# Patient Record
Sex: FEMALE | Race: WHITE | NOT HISPANIC OR LATINO | Employment: OTHER | URBAN - METROPOLITAN AREA
[De-identification: names, ages, dates, MRNs, and addresses within clinical notes are randomized per-mention and may not be internally consistent; named-entity substitution may affect disease eponyms.]

---

## 2019-07-24 RX ORDER — BUPROPION HYDROCHLORIDE 300 MG/1
300 TABLET ORAL DAILY
Qty: 30 TABLET | Refills: 2 | Status: SHIPPED | OUTPATIENT
Start: 2019-07-24 | End: 2019-12-05 | Stop reason: SDUPTHER

## 2019-07-24 RX ORDER — BUPROPION HYDROCHLORIDE 300 MG/1
1 TABLET ORAL DAILY
COMMUNITY
Start: 2018-12-31 | End: 2019-07-24 | Stop reason: SDUPTHER

## 2019-09-25 RX ORDER — RANITIDINE 150 MG/1
150 TABLET ORAL 2 TIMES DAILY
Qty: 60 TABLET | Refills: 2 | Status: SHIPPED | OUTPATIENT
Start: 2019-09-25 | End: 2021-03-10

## 2019-09-25 RX ORDER — RANITIDINE 150 MG/1
1 TABLET ORAL 2 TIMES DAILY
COMMUNITY
Start: 2019-04-06 | End: 2019-09-25 | Stop reason: SDUPTHER

## 2019-10-18 ENCOUNTER — OFFICE VISIT (OUTPATIENT)
Dept: FAMILY MEDICINE CLINIC | Facility: CLINIC | Age: 54
End: 2019-10-18

## 2019-10-18 VITALS
SYSTOLIC BLOOD PRESSURE: 130 MMHG | HEIGHT: 66 IN | BODY MASS INDEX: 21.28 KG/M2 | WEIGHT: 132.4 LBS | TEMPERATURE: 97.5 F | DIASTOLIC BLOOD PRESSURE: 79 MMHG | HEART RATE: 82 BPM | OXYGEN SATURATION: 93 %

## 2019-10-18 DIAGNOSIS — J06.9 ACUTE URI: Primary | ICD-10-CM

## 2019-10-18 PROBLEM — M46.1 INFLAMMATION OF SACROILIAC JOINT: Status: ACTIVE | Noted: 2017-01-05

## 2019-10-18 PROBLEM — F41.8 ANXIETY ASSOCIATED WITH DEPRESSION: Status: ACTIVE | Noted: 2017-12-14

## 2019-10-18 PROBLEM — Z87.09 HISTORY OF ASTHMA: Status: ACTIVE | Noted: 2019-09-03

## 2019-10-18 PROBLEM — R41.89 COGNITIVE IMPAIRMENT: Status: ACTIVE | Noted: 2018-06-27

## 2019-10-18 PROBLEM — J30.1 ALLERGIC RHINITIS DUE TO POLLEN: Status: ACTIVE | Noted: 2017-08-15

## 2019-10-18 PROCEDURE — 99213 OFFICE O/P EST LOW 20 MIN: CPT | Performed by: FAMILY MEDICINE

## 2019-10-18 RX ORDER — ASPIRIN 81 MG/1
1 TABLET ORAL EVERY 24 HOURS
COMMUNITY
Start: 2018-12-31 | End: 2021-03-10

## 2019-10-18 RX ORDER — DIPHENHYDRAMINE HCL 25 MG
25 TABLET ORAL AS NEEDED
COMMUNITY
End: 2021-03-10

## 2019-10-18 RX ORDER — AMOXICILLIN AND CLAVULANATE POTASSIUM 875; 125 MG/1; MG/1
1 TABLET, FILM COATED ORAL 2 TIMES DAILY
Qty: 20 TABLET | Refills: 0 | Status: SHIPPED | OUTPATIENT
Start: 2019-10-18 | End: 2019-11-08

## 2019-10-18 RX ORDER — GEMFIBROZIL 600 MG/1
1 TABLET, FILM COATED ORAL DAILY
COMMUNITY
Start: 2019-01-08 | End: 2021-03-10

## 2019-10-18 RX ORDER — GABAPENTIN 100 MG/1
300 CAPSULE ORAL DAILY
COMMUNITY
Start: 2016-02-26 | End: 2021-03-10

## 2019-10-18 RX ORDER — CETIRIZINE HYDROCHLORIDE 10 MG/1
10 TABLET ORAL DAILY
Refills: 11 | COMMUNITY
Start: 2019-09-25 | End: 2019-11-08

## 2019-10-18 RX ORDER — BRIMONIDINE TARTRATE 0.1 %
1 DROPS OPHTHALMIC (EYE) 2 TIMES DAILY
Refills: 5 | COMMUNITY
Start: 2019-08-28 | End: 2021-03-10

## 2019-10-18 RX ORDER — GUAIFENESIN 600 MG/1
1200 TABLET, EXTENDED RELEASE ORAL 2 TIMES DAILY
Qty: 14 TABLET | Refills: 0 | Status: SHIPPED | OUTPATIENT
Start: 2019-10-18 | End: 2021-03-10

## 2019-10-18 NOTE — PROGRESS NOTES
Subjective   Celine Musa is a 54 y.o. female.   Chief Complaint   Patient presents with   • Sore Throat   • Jaw Pain   • Earache       History of Present Illness   Presents to the office today with a problem.  She complains of pain in both ears, sore throat, pain in her left jaw and left side of her neck off and on for about 1 month.  She has some nasal congestion.  Occasionally blows her nose and get some yellowish-green phlegm that is blood-tinged.  She also complains of her nose being sticky.    She mentions additionally that she had some ongoing neck problems and pain down the right side of her body.  She tells me that her chiropractor told her it could be a disc problem and she should see her primary doctor.      There are no active problems to display for this patient.          Past Surgical History:   Procedure Laterality Date   • EYE SURGERY     • KNEE ARTHROSCOPY Left      Current Outpatient Medications on File Prior to Visit   Medication Sig   • ALPHAGAN P 0.1 % solution ophthalmic solution Administer 1 drop to both eyes 2 (Two) Times a Day.   • aspirin (ASPIRIN ADULT LOW DOSE) 81 MG EC tablet Take 1 tablet by mouth Daily.   • buPROPion XL (WELLBUTRIN XL) 300 MG 24 hr tablet Take 1 tablet by mouth Daily.   • cetirizine (zyrTEC) 10 MG tablet Take 10 mg by mouth Daily.   • Cholecalciferol (VITAMIN D3) 1000 units capsule Take 1 capsule by mouth Daily.   • diphenhydrAMINE (BENADRYL) 25 MG tablet Take 25 mg by mouth As Needed.   • gabapentin (NEURONTIN) 100 MG capsule Take 300 mg by mouth Daily.   • gemfibrozil (LOPID) 600 MG tablet Take 1 tablet by mouth Daily.   • Pilocarpine HCl (PILOCAR OP) 1-2 drops 4 (Four) Times a Day.   • PROAIR  (90 Base) MCG/ACT inhaler Inhale 1 puff 4 (Four) Times a Day.   • raNITIdine (ZANTAC) 150 MG tablet Take 1 tablet by mouth 2 (Two) Times a Day.   • Teriflunomide 14 MG tablet Take 14 mg by mouth Daily.   • [DISCONTINUED] brimonidine (ALPHAGAN P) 0.1 % solution  "ophthalmic solution Administer 1 drop to both eyes 2 (Two) Times a Day.     No current facility-administered medications on file prior to visit.      Allergies   Allergen Reactions   • Lipitor [Atorvastatin Calcium] Palpitations     Social History     Socioeconomic History   • Marital status: Unknown     Spouse name: Not on file   • Number of children: Not on file   • Years of education: Not on file   • Highest education level: Not on file   Tobacco Use   • Smoking status: Former Smoker     Last attempt to quit: 1989     Years since quittin.8   • Smokeless tobacco: Never Used     Family History   Problem Relation Age of Onset   • Hyperlipidemia Mother    • Hyperlipidemia Father    • Heart disease Father    • Leukemia Father    • No Known Problems Son    • No Known Problems Half-Sister    • No Known Problems Half-Sister    • No Known Problems Son      The following portions of the patient's history were reviewed and updated as appropriate: allergies, current medications and problem list.    Review of Systems   Constitutional: Negative for chills and fever.   Respiratory: Negative for cough and shortness of breath.    Cardiovascular: Negative for chest pain.   Gastrointestinal: Negative for abdominal pain.   Neurological: Negative for light-headedness and headache.       Objective   /79 (BP Location: Right arm, Patient Position: Sitting, Cuff Size: Adult)   Pulse 82   Temp 97.5 °F (36.4 °C) (Oral)   Ht 167.6 cm (66\")   Wt 60.1 kg (132 lb 6.4 oz)   SpO2 93%   BMI 21.37 kg/m²   Physical Exam   Constitutional: She is oriented to person, place, and time. She appears well-developed and well-nourished.   HENT:   Head: Normocephalic and atraumatic.   Right Ear: Ear canal normal. Tympanic membrane is bulging (clear fluid behind). Tympanic membrane is not erythematous and not retracted. cerumen impaction (only partial) is present.  Left Ear: Ear canal normal. Tympanic membrane is bulging (clear fluid " behind). Tympanic membrane is not erythematous and not retracted. An impacted cerumen (only partial) is present.  Nose: Mucosal edema and congestion present. No nasal deformity or nasal septal hematoma.  No foreign bodies. Right sinus exhibits maxillary sinus tenderness. Left sinus exhibits maxillary sinus tenderness.   Eyes: Conjunctivae and EOM are normal.   Neck: Normal range of motion.   Cardiovascular: Normal rate, regular rhythm and normal heart sounds.   Pulmonary/Chest: Effort normal and breath sounds normal. No respiratory distress.   Musculoskeletal: Normal range of motion.   Neurological: She is alert and oriented to person, place, and time.   Skin: Skin is warm and dry. No rash noted.   Psychiatric: She has a normal mood and affect. Her behavior is normal.       Assessment/Plan   Diagnoses and all orders for this visit:    1. Acute URI (Primary)    Other orders  -     amoxicillin-clavulanate (AUGMENTIN) 875-125 MG per tablet; Take 1 tablet by mouth 2 (Two) Times a Day.  Dispense: 20 tablet; Refill: 0  -     guaiFENesin (MUCINEX) 600 MG 12 hr tablet; Take 2 tablets by mouth 2 (Two) Times a Day.  Dispense: 14 tablet; Refill: 0  -     mupirocin (BACTROBAN NASAL) 2 % nasal ointment; into the nostril(s) as directed by provider 2 (Two) Times a Day for 7 days.  Dispense: 1 g; Refill: 1    Appears to be a lot of upper nasal congestion and possibly some macular sinusitis with Augmentin.  Also use Mucinex twice daily for 1 week lots of water.  We will have her do the nasal Bactroban twice daily for 7 days as I suspect that she has some colonization of her nasal mucosa.  Recommend following up with Chantale next week or at her convenience to talk about the potential issues raised above.             Return for needs to see Chantale soon to see about neck and  lateral numbness.

## 2019-10-30 ENCOUNTER — TELEPHONE (OUTPATIENT)
Dept: FAMILY MEDICINE CLINIC | Facility: CLINIC | Age: 54
End: 2019-10-30

## 2019-10-30 RX ORDER — AZITHROMYCIN 250 MG/1
TABLET, FILM COATED ORAL
Qty: 6 TABLET | Refills: 0 | Status: SHIPPED | OUTPATIENT
Start: 2019-10-30 | End: 2019-11-08

## 2019-10-30 NOTE — TELEPHONE ENCOUNTER
Pt calling was just seen in office recently still no better. Finished amoicillian yest.  Still having same issues, trouble breathing , radha ear pain, sore throat.  Pt stated Lanie helped before.  Please advise

## 2019-11-08 ENCOUNTER — TELEPHONE (OUTPATIENT)
Dept: FAMILY MEDICINE CLINIC | Facility: CLINIC | Age: 54
End: 2019-11-08

## 2019-11-08 ENCOUNTER — OFFICE VISIT (OUTPATIENT)
Dept: FAMILY MEDICINE CLINIC | Facility: CLINIC | Age: 54
End: 2019-11-08

## 2019-11-08 VITALS
OXYGEN SATURATION: 98 % | HEART RATE: 69 BPM | TEMPERATURE: 97.9 F | DIASTOLIC BLOOD PRESSURE: 73 MMHG | HEIGHT: 66 IN | SYSTOLIC BLOOD PRESSURE: 111 MMHG | BODY MASS INDEX: 21.21 KG/M2 | WEIGHT: 132 LBS | RESPIRATION RATE: 18 BRPM

## 2019-11-08 DIAGNOSIS — R05.9 COUGH: ICD-10-CM

## 2019-11-08 DIAGNOSIS — M54.2 NECK PAIN: ICD-10-CM

## 2019-11-08 DIAGNOSIS — G89.29 CHRONIC MIDLINE LOW BACK PAIN WITH BILATERAL SCIATICA: ICD-10-CM

## 2019-11-08 DIAGNOSIS — M54.42 CHRONIC MIDLINE LOW BACK PAIN WITH BILATERAL SCIATICA: ICD-10-CM

## 2019-11-08 DIAGNOSIS — M54.41 CHRONIC MIDLINE LOW BACK PAIN WITH BILATERAL SCIATICA: ICD-10-CM

## 2019-11-08 DIAGNOSIS — R09.82 POST-NASAL DRAINAGE: Primary | ICD-10-CM

## 2019-11-08 PROCEDURE — 99214 OFFICE O/P EST MOD 30 MIN: CPT | Performed by: FAMILY MEDICINE

## 2019-11-08 RX ORDER — FEXOFENADINE HCL 180 MG/1
180 TABLET ORAL DAILY
COMMUNITY
End: 2019-11-08 | Stop reason: SDUPTHER

## 2019-11-08 RX ORDER — FEXOFENADINE HCL 180 MG/1
180 TABLET ORAL DAILY
Qty: 30 TABLET | Refills: 0 | Status: SHIPPED | OUTPATIENT
Start: 2019-11-08 | End: 2021-03-10

## 2019-11-08 NOTE — TELEPHONE ENCOUNTER
PATIENT CALLED SAID SHE WENT TO THE PHARMACY AND SHE SAID SHE WAS SUPPOSED TO  TWO PRESCRIPTIONS AND ONLY GOT ONE- SHE SAID  ACTED LIKE HE WAS GOING TO CALL SOMETHING IN FOR HER CHEST CONGESTION- [facility] IN [place]

## 2019-11-08 NOTE — PROGRESS NOTES
Subjective   Celine Musa is a 54 y.o. female.   Chief Complaint   Patient presents with   • URI     still coughing and not feeling well. Still has productive cough, but does not have a color.        History of Present Illness   Came in today with complaint that she was still sick.  Upon further questioning, she has an occasional dry cough.  She has a postnasal drip and runny nose.  She does not have any fevers, chills.  She then sideways into essentially unrelated issues.  She described at the last visit, and brings up again today she has numbness and tingling in all 4 extremity's at random times.  She describes having pain and popping noises in her back and neck.  She describes having pain in her mid back when she coughs.  Tells me she thought she might have pneumonia because of that.    She was given Bactroban at the last visit because of some minimal bloody nasal discharge due to friable mucous membranes of the nares.  She lost that and request a refill.      Patient Active Problem List    Diagnosis Date Noted   • Neck pain 11/08/2019   • Chronic midline low back pain with bilateral sciatica 11/08/2019   • History of asthma 09/03/2019   • Cognitive impairment 06/27/2018   • Anxiety associated with depression 12/14/2017   • Allergic rhinitis due to pollen 08/15/2017   • Inflammation of sacroiliac joint (CMS/[facility]) 01/05/2017   • Acid reflux 02/26/2016   • Glaucoma 02/26/2016   • Constipation 02/26/2016   • History of anemia 02/26/2016   • Multiple sclerosis (CMS/[facility]) 09/25/2012           Past Surgical History:   Procedure Laterality Date   • EYE SURGERY     • KNEE ARTHROSCOPY Left      Current Outpatient Medications on File Prior to Visit   Medication Sig   • ALPHAGAN P 0.1 % solution ophthalmic solution Administer 1 drop to both eyes 2 (Two) Times a Day.   • aspirin (ASPIRIN ADULT LOW DOSE) 81 MG EC tablet Take 1 tablet by mouth Daily.   • buPROPion XL (WELLBUTRIN XL) 300 MG 24 hr tablet Take 1 tablet  by mouth Daily.   • cetirizine (zyrTEC) 10 MG tablet Take 10 mg by mouth Daily.   • Cholecalciferol (VITAMIN D3) 1000 units capsule Take 1 capsule by mouth Daily.   • diphenhydrAMINE (BENADRYL) 25 MG tablet Take 25 mg by mouth As Needed.   • gabapentin (NEURONTIN) 100 MG capsule Take 300 mg by mouth Daily.   • gemfibrozil (LOPID) 600 MG tablet Take 1 tablet by mouth Daily.   • guaiFENesin (MUCINEX) 600 MG 12 hr tablet Take 2 tablets by mouth 2 (Two) Times a Day.   • Pilocarpine HCl (PILOCAR OP) 1-2 drops 4 (Four) Times a Day.   • PROAIR  (90 Base) MCG/ACT inhaler Inhale 1 puff 4 (Four) Times a Day.   • raNITIdine (ZANTAC) 150 MG tablet Take 1 tablet by mouth 2 (Two) Times a Day.   • Teriflunomide 14 MG tablet Take 14 mg by mouth Daily.   • [DISCONTINUED] mupirocin (BACTROBAN) 2 % ointment 1 application into the nostril(s) as directed by provider 2 (Two) Times a Day. For 7 days   • [DISCONTINUED] amoxicillin-clavulanate (AUGMENTIN) 875-125 MG per tablet Take 1 tablet by mouth 2 (Two) Times a Day.   • [DISCONTINUED] azithromycin (ZITHROMAX Z-MARGAUX) 250 MG tablet Take 2 tablets the first day, then 1 tablet daily for 4 days.     No current facility-administered medications on file prior to visit.      Allergies   Allergen Reactions   • Lipitor [Atorvastatin Calcium] Palpitations     Social History     Socioeconomic History   • Marital status: Unknown     Spouse name: Not on file   • Number of children: Not on file   • Years of education: Not on file   • Highest education level: Not on file   Tobacco Use   • Smoking status: Former Smoker     Last attempt to quit: 1989     Years since quittin.8   • Smokeless tobacco: Never Used     Family History   Problem Relation Age of Onset   • Hyperlipidemia Mother    • Hyperlipidemia Father    • Heart disease Father    • Leukemia Father    • No Known Problems Son    • No Known Problems Half-Sister    • No Known Problems Half-Sister    • No Known Problems Son      The  "following portions of the patient's history were reviewed and updated as appropriate: allergies, current medications, past family history, past medical history, past social history, past surgical history and problem list.    Review of Systems   Constitutional: Negative for chills and fever.   Respiratory: Positive for cough. Negative for shortness of breath.    Cardiovascular: Negative for chest pain.   Gastrointestinal: Negative for abdominal pain.   Neurological: Positive for dizziness, numbness and memory problem. Negative for seizures, light-headedness, headache and confusion.       Objective   /73 (BP Location: Left arm, Patient Position: Sitting, Cuff Size: Adult)   Pulse 69   Temp 97.9 °F (36.6 °C) (Oral)   Resp 18   Ht 167.6 cm (66\")   Wt 59.9 kg (132 lb)   SpO2 98%   BMI 21.31 kg/m²   Physical Exam   Constitutional: She is oriented to person, place, and time. She appears well-developed and well-nourished.   HENT:   Head: Normocephalic and atraumatic.   Nose: Rhinorrhea (clear) present.   Mouth/Throat: Oropharynx is clear and moist.   Eyes: Conjunctivae and EOM are normal. Pupils are equal, round, and reactive to light.   Neck: Neck supple. No JVD present. No thyromegaly present.   Cardiovascular: Normal rate, regular rhythm, normal heart sounds and intact distal pulses.   No murmur heard.  Pulmonary/Chest: Effort normal and breath sounds normal. No respiratory distress. She has no wheezes. She has no rales. She exhibits no tenderness.   Abdominal: Soft. She exhibits no distension and no mass. There is no tenderness. There is no rebound and no guarding.   Musculoskeletal: Normal range of motion. She exhibits no edema.   Lymphadenopathy:     She has no cervical adenopathy.   Neurological: She is alert and oriented to person, place, and time.   Skin: Skin is warm. No rash noted.   Psychiatric: She has a normal mood and affect. Her behavior is normal.         Assessment/Plan   Diagnoses and all " orders for this visit:    1. Post-nasal drainage (Primary)    2. Cough  -     XR Chest 2 View; Future    3. Neck pain  -     XR Spine Cervical Complete 4 or 5 View; Future    4. Chronic midline low back pain with bilateral sciatica  -     XR Spine Lumbar 4+ View; Future    Other orders  -     mupirocin (BACTROBAN) 2 % ointment; 1 application into the nostril(s) as directed by provider 2 (Two) Times a Day. For 7 days  Dispense: 30 g; Refill: 1    Review of the chart indicates she has a diagnosis of multiple sclerosis, and she does not mention that at all today.  It looks like she has seen a Dr. Martin at [facility] for her MS.  Review of the records further indicates she has complained then of numbness in her arms and legs that he attributed to her MS.  She does not appear to be clinically ill today, and I recommend we continue to treat her rhinorrhea with an antihistamine and Mucinex.  We will begin a work-up of her spine with cervical and lumbar x-rays.  We will go ahead and get a chest x-ray because of her chronic cough.  If work-up appears negative, it is likely these paresthesias symptoms are related to her MS I will defer that further on to her neurologist.         Return in about 1 week (around 11/15/2019).to go over her x-rays

## 2019-11-08 NOTE — TELEPHONE ENCOUNTER
S/w Dr. Butcher re: this patient earlier. Per Dr. Nugent, cancel patient's Zyrtec and have patient start taking 180mg of Allegra once daily. Called patient. Patient's identity verified.  Advised her to STOP Zyrtec and START Allegra. She was advised that a 30 day supply of the Allegra was sent to pharmacy. She voiced understanding. No extra concerns voiced by patient.

## 2019-11-26 DIAGNOSIS — M54.2 NECK PAIN: ICD-10-CM

## 2019-11-26 DIAGNOSIS — G89.29 CHRONIC MIDLINE LOW BACK PAIN WITH BILATERAL SCIATICA: ICD-10-CM

## 2019-11-26 DIAGNOSIS — R05.9 COUGH: ICD-10-CM

## 2019-11-26 DIAGNOSIS — M54.41 CHRONIC MIDLINE LOW BACK PAIN WITH BILATERAL SCIATICA: ICD-10-CM

## 2019-11-26 DIAGNOSIS — M54.42 CHRONIC MIDLINE LOW BACK PAIN WITH BILATERAL SCIATICA: ICD-10-CM

## 2019-11-27 ENCOUNTER — TELEPHONE (OUTPATIENT)
Dept: FAMILY MEDICINE CLINIC | Facility: CLINIC | Age: 54
End: 2019-11-27

## 2019-11-27 NOTE — TELEPHONE ENCOUNTER
----- Message from Jovanna Butcher MD sent at 11/26/2019  5:25 PM EST -----  Please tell patient that I thought the plan was for her to come back and go over her x-rays.  If she would like to do that, have her schedule an appointment,  Thanks

## 2019-12-05 DIAGNOSIS — F41.8 ANXIETY ASSOCIATED WITH DEPRESSION: Primary | ICD-10-CM

## 2019-12-05 RX ORDER — BUPROPION HYDROCHLORIDE 300 MG/1
TABLET ORAL
Qty: 30 TABLET | Refills: 2 | Status: SHIPPED | OUTPATIENT
Start: 2019-12-05 | End: 2020-02-17

## 2019-12-05 NOTE — TELEPHONE ENCOUNTER
Received refill request for patient's Wellbutrin.   Last OV: 11-8-19 (acute visit only)  Next OV: NONE scheduled   Last Labs: 9-3-19    Med is loaded and ready to be refilled. Thanks.

## 2020-02-16 DIAGNOSIS — F41.8 ANXIETY ASSOCIATED WITH DEPRESSION: ICD-10-CM

## 2020-02-17 RX ORDER — BUPROPION HYDROCHLORIDE 300 MG/1
TABLET ORAL
Qty: 90 TABLET | Refills: 3 | Status: SHIPPED | OUTPATIENT
Start: 2020-02-17 | End: 2021-03-10

## 2021-03-10 ENCOUNTER — OFFICE VISIT (OUTPATIENT)
Dept: FAMILY MEDICINE CLINIC | Facility: CLINIC | Age: 56
End: 2021-03-10

## 2021-03-10 VITALS
WEIGHT: 130.6 LBS | HEIGHT: 66 IN | TEMPERATURE: 97.1 F | DIASTOLIC BLOOD PRESSURE: 84 MMHG | SYSTOLIC BLOOD PRESSURE: 126 MMHG | OXYGEN SATURATION: 96 % | HEART RATE: 73 BPM | BODY MASS INDEX: 20.99 KG/M2

## 2021-03-10 DIAGNOSIS — S99.921A INJURY OF TOE ON RIGHT FOOT, INITIAL ENCOUNTER: Primary | ICD-10-CM

## 2021-03-10 PROBLEM — D32.9 MENINGIOMA: Status: ACTIVE | Noted: 2020-12-15

## 2021-03-10 PROBLEM — M47.812 OSTEOARTHRITIS CERVICAL SPINE: Status: ACTIVE | Noted: 2020-01-21

## 2021-03-10 PROCEDURE — 99213 OFFICE O/P EST LOW 20 MIN: CPT | Performed by: NURSE PRACTITIONER

## 2021-03-10 NOTE — PROGRESS NOTES
"    Celine Musa is a 56 y.o. female.     Patient is more 56-year-old white female with history of chronic pain, asthma, anxiety who comes in today after hitting right foot on a piece of furniture resulting in right middle toe being swollen and bruised.  Patient is requesting x-ray.  She has been taking NSAIDs for pain and I advised to lakesha tape the toe.  Blood pressure 126/84 heart rate 72 she denies any chest pain, dyspnea, tachycardia or dizziness  Weight is 131 with a BMI of 21.1     x-ray right foot  Follow-up Dr. Butcher         The following portions of the patient's history were reviewed and updated as appropriate: allergies, current medications, past family history, past medical history, past social history, past surgical history and problem list.    Vitals:    03/10/21 0837   BP: 126/84   BP Location: Right arm   Patient Position: Sitting   Cuff Size: Adult   Pulse: 73   Temp: 97.1 °F (36.2 °C)   TempSrc: Temporal   SpO2: 96%   Weight: 59.2 kg (130 lb 9.6 oz)   Height: 167.6 cm (66\")     Body mass index is 21.08 kg/m².    Past Medical History:   Diagnosis Date   • GERD (gastroesophageal reflux disease)    • Hyperlipidemia    • Multiple sclerosis (CMS/HCC)      Past Surgical History:   Procedure Laterality Date   • EYE SURGERY     • KNEE ARTHROSCOPY Left      Family History   Problem Relation Age of Onset   • Hyperlipidemia Mother    • Hyperlipidemia Father    • Heart disease Father    • Leukemia Father    • No Known Problems Son    • No Known Problems Half-Sister    • No Known Problems Half-Sister    • No Known Problems Son      Immunization History   Administered Date(s) Administered   • Flu Vaccine Quad PF 6-35MO 12/14/2017   • H1N1 Inj 11/14/2009   • Tdap 06/24/2018       Conversion Encounter on 04/08/2016   Component Date Value Ref Range Status   • Albumin 04/08/2016 4.6  3.6 - 5.1 g/dL Final   • Alkaline Phosphatase 04/08/2016 51  33 - 130 units/L Final   • Basophils Absolute 04/08/2016 " 17 CELLS/UL  0 - 200 10*3/mm3 Final   • Basophil Rel % 04/08/2016 0.4  % Final   • Total Bilirubin 04/08/2016 0.6  0.2 - 1.2 mg/dL Final   • BUN 04/08/2016 16  7 - 25 mg/dL Final   • BUN/Creatinine Ratio 04/08/2016 NOT APPLICABLE (calc)  6 - 22 Final   • Calcium 04/08/2016 9.8  8.6 - 10.4 mg/dL Final   • Chloride 04/08/2016 103  98 - 110 mmol/L Final   • Chol/HDL Ratio 04/08/2016 3.3 (calc)  < OR = 5.0 Final   • Total Cholesterol 04/08/2016 216* 125 - 200 mg/dL Final   • CO2 CONTENT  04/08/2016 26  19 - 30 mmol/L Final   • Creatinine 04/08/2016 0.84  0.50 - 1.05 mg/dL Final   • eGFR African Am 04/08/2016 80  > OR = 60 mL/min/1.73m2 Final   • eGFR Non African Am 04/08/2016 93  > OR = 60 mL/min/1.73m2 Final   • Eosinophils Absolute 04/08/2016 118 CELLS/UL  15 - 500 10*3/mm3 Final   • Eosinophil Rel % 04/08/2016 2.8  % Final   • Globulin 04/08/2016 2.7 G/DL (CALC)  1.9 - 3.7 g/dL Final   • Glucose 04/08/2016 85  65 - 99 mg/dL Final   • Hematocrit 04/08/2016 43.0  35.0 - 45.0 % Final   • HDL Cholesterol 04/08/2016 66  > OR = 46 mg/dL Final   • Hemoglobin 04/08/2016 14.4  11.7 - 15.5 g/dL Final   • LDL Cholesterol  04/08/2016 133 MG/DL (CALC)* <130 mg/dL Final   • Lymphocytes Absolute 04/08/2016 1340 CELLS/UL  850 - 3900 10*3/mm3 Final   • Lymphocyte Rel % 04/08/2016 31.9  % Final   • MCH 04/08/2016 29.3  27.0 - 33.0 pg Final   • MCHC 04/08/2016 33.4 G/DL  32.0 - 36.0 % Final   • MCV 04/08/2016 87.8  80.0 - 100.0 fL Final   • Monocyte Rel % 04/08/2016 7.5  % Final   • Monocytes Absolute 04/08/2016 315 CELLS/UL  200 - 950 10*3/microliter Final   • MPV 04/08/2016 10.1  7.5 - 11.5 fL Final   • Neutrophils Absolute 04/08/2016 2411 CELLS/UL  1500 - 7800 10*3/mm3 Final   • Platelets 04/08/2016 181 THOUSAND/UL  140 - 400 10*3/mm3 Final   • Neutrophils, Fluid 04/08/2016 57.4  % Final   • Potassium 04/08/2016 4.6  3.5 - 5.3 mmol/L Final   • Total Protein 04/08/2016 7.3  6.1 - 8.1 g/dL Final   • RBC 04/08/2016 4.90 MILLION/UL   3.80 - 5.10 10*6/mm3 Final   • RDW 04/08/2016 16.2* 11.0 - 15.0 % Final   • AST (SGOT) 04/08/2016 14  10 - 35 units/L Final   • ALT (SGPT) 04/08/2016 16  6 - 29 units/L Final   • Sodium 04/08/2016 138  135 - 146 mmol/L Final   • Triglycerides 04/08/2016 83  <150 mg/dL Final   • WBC 04/08/2016 4.2 THOUSAND/UL  3.8 - 10.8 K/uL Final   • A/G Ratio 04/08/2016 1.7 (calc)  1.0 - 2.5 Final   • Albumin 12/29/2017 4.6  3.6 - 5.1 g/dL Final   • Alkaline Phosphatase 12/29/2017 55  33 - 130 units/L Final   • Basophils Absolute 12/29/2017 41 CELLS/UL  0 - 200 10*3/mm3 Final   • Basophil Rel % 12/29/2017 1.1  % Final   • Total Bilirubin 12/29/2017 0.5  0.2 - 1.2 mg/dL Final   • BUN 12/29/2017 12  7 - 25 mg/dL Final   • BUN/Creatinine Ratio 12/29/2017 NOT APPLICABLE (calc)  6 - 22 Final   • Calcium 12/29/2017 9.9  8.6 - 10.4 mg/dL Final   • Chloride 12/29/2017 103  98 - 110 mmol/L Final   • Chol/HDL Ratio 12/29/2017 3.0 (calc)  <5.0 Final   • Total Cholesterol 12/29/2017 238* <200 mg/dL Final   • CO2 CONTENT  12/29/2017 29  20 - 31 mmol/L Final   • Creatinine 12/29/2017 0.93  0.50 - 1.05 mg/dL Final   • eGFR African Am 12/29/2017 71  > OR = 60 mL/min/1.73m2 Final   • eGFR Non African Am 12/29/2017 82  > OR = 60 mL/min/1.73m2 Final   • Eosinophils Absolute 12/29/2017 81 CELLS/UL  15 - 500 10*3/mm3 Final   • Eosinophil Rel % 12/29/2017 2.2  % Final   • Globulin 12/29/2017 3.3 G/DL (CALC)  1.9 - 3.7 g/dL Final   • Glucose 12/29/2017 89  65 - 99 mg/dL Final   • Hematocrit 12/29/2017 43.4  35.0 - 45.0 % Final   • HDL Cholesterol 12/29/2017 79  >50 mg/dL Final   • Hemoglobin 12/29/2017 14.9  11.7 - 15.5 g/dL Final   • LDL Cholesterol  12/29/2017 140 MG/DL (CALC)* mg/dL Final   • Lymphocytes Absolute 12/29/2017 1147 CELLS/UL  850 - 3900 10*3/mm3 Final   • Lymphocyte Rel % 12/29/2017 31.0  % Final   • MCH 12/29/2017 29.5  27.0 - 33.0 pg Final   • MCHC 12/29/2017 34.3 G/DL  32.0 - 36.0 % Final   • MCV 12/29/2017 85.9  80.0 - 100.0 fL Final    • Monocyte Rel % 12/29/2017 10.3  % Final   • Monocytes Absolute 12/29/2017 381 CELLS/UL  200 - 950 10*3/microliter Final   • MPV 12/29/2017 11.1  7.5 - 12.5 fL Final   • Neutrophils Absolute 12/29/2017 2050 CELLS/UL  1500 - 7800 10*3/mm3 Final   • Platelets 12/29/2017 253 THOUSAND/UL  140 - 400 10*3/mm3 Final   • Neutrophils, Fluid 12/29/2017 55.4  % Final   • Potassium 12/29/2017 4.6  3.5 - 5.3 mmol/L Final   • Total Protein 12/29/2017 7.9  6.1 - 8.1 g/dL Final   • RBC 12/29/2017 5.05 MILLION/UL  3.80 - 5.10 10*6/mm3 Final   • RDW 12/29/2017 13.9  11.0 - 15.0 % Final   • AST (SGOT) 12/29/2017 18  10 - 35 units/L Final   • ALT (SGPT) 12/29/2017 18  6 - 29 units/L Final   • Sodium 12/29/2017 140  135 - 146 mmol/L Final   • Triglycerides 12/29/2017 87  <150 mg/dL Final   • TSH 12/29/2017 0.99  mIU/L Final   • WBC 12/29/2017 3.7 THOUSAND/UL* 3.8 - 10.8 K/uL Final   • A/G Ratio 12/29/2017 1.4 (calc)  1.0 - 2.5 Final   • Albumin 12/31/2018 4.5  3.6 - 5.1 g/dL Final   • Alkaline Phosphatase 12/31/2018 52  33 - 130 units/L Final   • Basophils Absolute 12/31/2018 32 CELLS/UL  0 - 200 10*3/mm3 Final   • Basophil Rel % 12/31/2018 0.8  % Final   • Total Bilirubin 12/31/2018 0.5  0.2 - 1.2 mg/dL Final   • BUN 12/31/2018 14  7 - 25 mg/dL Final   • BUN/Creatinine Ratio 12/31/2018 NOT APPLICABLE (calc)  6 - 22 Final   • Calcium 12/31/2018 9.7  8.6 - 10.4 mg/dL Final   • Chloride 12/31/2018 104  98 - 110 mmol/L Final   • Chol/HDL Ratio 12/31/2018 2.6 (calc)  <5.0 Final   • Total Cholesterol 12/31/2018 223* <200 mg/dL Final   • CO2 CONTENT  12/31/2018 27  20 - 32 mmol/L Final   • Creatinine 12/31/2018 0.79  0.50 - 1.05 mg/dL Final   • C-Reactive Protein 12/31/2018 0.04  Units converted. See lab report for original value. mg/dL Final   • eGFR African Am 12/31/2018 85  > OR = 60 mL/min/1.73m2 Final   • eGFR Non African Am 12/31/2018 99  > OR = 60 mL/min/1.73m2 Final   • Eosinophils Absolute 12/31/2018 112 CELLS/UL  15 - 500  10*3/mm3 Final   • Eosinophil Rel % 12/31/2018 2.8  % Final   • Sed Rate 12/31/2018 2  < OR = 30 mm/hr Final   • Globulin 12/31/2018 2.8 G/DL (CALC)  1.9 - 3.7 g/dL Final   • Glucose 12/31/2018 77  65 - 99 mg/dL Final   • Hematocrit 12/31/2018 40.0  35.0 - 45.0 % Final   • HDL Cholesterol 12/31/2018 86  >50 mg/dL Final   • Hemoglobin 12/31/2018 13.6  11.7 - 15.5 g/dL Final   • LDL Cholesterol  12/31/2018 122 MG/DL (CALC)* mg/dL Final   • Lymphocytes Absolute 12/31/2018 1060 CELLS/UL  850 - 3900 10*3/mm3 Final   • Lymphocyte Rel % 12/31/2018 26.5  % Final   • MCH 12/31/2018 30.0  27.0 - 33.0 pg Final   • MCHC 12/31/2018 34.0 G/DL  32.0 - 36.0 % Final   • MCV 12/31/2018 88.1  80.0 - 100.0 fL Final   • Monocyte Rel % 12/31/2018 9.6  % Final   • Monocytes Absolute 12/31/2018 384 CELLS/UL  200 - 950 10*3/microliter Final   • MPV 12/31/2018 11.7  7.5 - 12.5 fL Final   • Neutrophils Absolute 12/31/2018 2412 CELLS/UL  1500 - 7800 10*3/mm3 Final   • Platelets 12/31/2018 225 THOUSAND/UL  140 - 400 10*3/mm3 Final   • Neutrophils, Fluid 12/31/2018 60.3  % Final   • Potassium 12/31/2018 3.9  3.5 - 5.3 mmol/L Final   • Total Protein 12/31/2018 7.3  6.1 - 8.1 g/dL Final   • RBC 12/31/2018 4.54 MILLION/UL  3.80 - 5.10 10*6/mm3 Final   • RDW 12/31/2018 12.9  11.0 - 15.0 % Final   • RF Titer 12/31/2018 <14  <14 Final   • AST (SGOT) 12/31/2018 18  10 - 35 units/L Final   • ALT (SGPT) 12/31/2018 21  6 - 29 units/L Final   • Sodium 12/31/2018 140  135 - 146 mmol/L Final   • Triglycerides 12/31/2018 49  <150 mg/dL Final   • WBC 12/31/2018 4.0 THOUSAND/UL  3.8 - 10.8 K/uL Final   • A/G Ratio 12/31/2018 1.6 (calc)  1.0 - 2.5 Final   • Urine Culture 03/08/2019 **  Final         Review of Systems   Constitutional: Negative.    HENT: Negative.    Respiratory: Negative.    Cardiovascular: Negative.    Gastrointestinal: Negative.    Genitourinary: Negative.    Musculoskeletal:        Right middle toe pain   Skin: Negative.     Psychiatric/Behavioral: Negative.        Objective   Physical Exam  Constitutional:       Appearance: Normal appearance.   HENT:      Head: Normocephalic.   Cardiovascular:      Rate and Rhythm: Normal rate and regular rhythm.      Pulses: Normal pulses.      Heart sounds: Normal heart sounds.   Pulmonary:      Effort: Pulmonary effort is normal.   Abdominal:      General: Bowel sounds are normal.   Musculoskeletal:      Cervical back: Normal range of motion.      Comments: Right middle toe bruised around nail bed and slightly swollen and painful to touch   Skin:     General: Skin is warm and dry.   Neurological:      General: No focal deficit present.      Mental Status: She is alert and oriented to person, place, and time.   Psychiatric:         Mood and Affect: Mood normal.         Behavior: Behavior normal.         Procedures    Assessment/Plan   Diagnoses and all orders for this visit:    1. Injury of toe on right foot, initial encounter (Primary)  -     XR Foot 3+ View Right          Current Outpatient Medications:   •  Teriflunomide 14 MG tablet, Take 14 mg by mouth Daily., Disp: , Rfl:

## 2021-03-10 NOTE — PROGRESS NOTES
"Celine Musa is a 56 y.o. female.           The following portions of the patient's history were reviewed and updated as appropriate: allergies, current medications, past family history, past medical history, past social history, past surgical history and problem list.    Vitals:    03/10/21 0837   BP: 126/84   BP Location: Right arm   Patient Position: Sitting   Cuff Size: Adult   Pulse: 73   Temp: 97.1 °F (36.2 °C)   TempSrc: Temporal   SpO2: 96%   Weight: 59.2 kg (130 lb 9.6 oz)   Height: 167.6 cm (66\")     Body mass index is 21.08 kg/m².    Past Medical History:   Diagnosis Date   • GERD (gastroesophageal reflux disease)    • Hyperlipidemia    • Multiple sclerosis (CMS/HCC)      Past Surgical History:   Procedure Laterality Date   • EYE SURGERY     • KNEE ARTHROSCOPY Left      Family History   Problem Relation Age of Onset   • Hyperlipidemia Mother    • Hyperlipidemia Father    • Heart disease Father    • Leukemia Father    • No Known Problems Son    • No Known Problems Half-Sister    • No Known Problems Half-Sister    • No Known Problems Son      Immunization History   Administered Date(s) Administered   • Flu Vaccine Quad PF 6-35MO 12/14/2017   • H1N1 Inj 11/14/2009   • Tdap 06/24/2018       Conversion Encounter on 04/08/2016   Component Date Value Ref Range Status   • Albumin 04/08/2016 4.6  3.6 - 5.1 g/dL Final   • Alkaline Phosphatase 04/08/2016 51  33 - 130 units/L Final   • Basophils Absolute 04/08/2016 17 CELLS/UL  0 - 200 10*3/mm3 Final   • Basophil Rel % 04/08/2016 0.4  % Final   • Total Bilirubin 04/08/2016 0.6  0.2 - 1.2 mg/dL Final   • BUN 04/08/2016 16  7 - 25 mg/dL Final   • BUN/Creatinine Ratio 04/08/2016 NOT APPLICABLE (calc)  6 - 22 Final   • Calcium 04/08/2016 9.8  8.6 - 10.4 mg/dL Final   • Chloride 04/08/2016 103  98 - 110 mmol/L Final   • Chol/HDL Ratio 04/08/2016 3.3 (calc)  < OR = 5.0 Final   • Total Cholesterol 04/08/2016 216* 125 - 200 mg/dL Final   • CO2 CONTENT  " 04/08/2016 26  19 - 30 mmol/L Final   • Creatinine 04/08/2016 0.84  0.50 - 1.05 mg/dL Final   • eGFR African Am 04/08/2016 80  > OR = 60 mL/min/1.73m2 Final   • eGFR Non African Am 04/08/2016 93  > OR = 60 mL/min/1.73m2 Final   • Eosinophils Absolute 04/08/2016 118 CELLS/UL  15 - 500 10*3/mm3 Final   • Eosinophil Rel % 04/08/2016 2.8  % Final   • Globulin 04/08/2016 2.7 G/DL (CALC)  1.9 - 3.7 g/dL Final   • Glucose 04/08/2016 85  65 - 99 mg/dL Final   • Hematocrit 04/08/2016 43.0  35.0 - 45.0 % Final   • HDL Cholesterol 04/08/2016 66  > OR = 46 mg/dL Final   • Hemoglobin 04/08/2016 14.4  11.7 - 15.5 g/dL Final   • LDL Cholesterol  04/08/2016 133 MG/DL (CALC)* <130 mg/dL Final   • Lymphocytes Absolute 04/08/2016 1340 CELLS/UL  850 - 3900 10*3/mm3 Final   • Lymphocyte Rel % 04/08/2016 31.9  % Final   • MCH 04/08/2016 29.3  27.0 - 33.0 pg Final   • MCHC 04/08/2016 33.4 G/DL  32.0 - 36.0 % Final   • MCV 04/08/2016 87.8  80.0 - 100.0 fL Final   • Monocyte Rel % 04/08/2016 7.5  % Final   • Monocytes Absolute 04/08/2016 315 CELLS/UL  200 - 950 10*3/microliter Final   • MPV 04/08/2016 10.1  7.5 - 11.5 fL Final   • Neutrophils Absolute 04/08/2016 2411 CELLS/UL  1500 - 7800 10*3/mm3 Final   • Platelets 04/08/2016 181 THOUSAND/UL  140 - 400 10*3/mm3 Final   • Neutrophils, Fluid 04/08/2016 57.4  % Final   • Potassium 04/08/2016 4.6  3.5 - 5.3 mmol/L Final   • Total Protein 04/08/2016 7.3  6.1 - 8.1 g/dL Final   • RBC 04/08/2016 4.90 MILLION/UL  3.80 - 5.10 10*6/mm3 Final   • RDW 04/08/2016 16.2* 11.0 - 15.0 % Final   • AST (SGOT) 04/08/2016 14  10 - 35 units/L Final   • ALT (SGPT) 04/08/2016 16  6 - 29 units/L Final   • Sodium 04/08/2016 138  135 - 146 mmol/L Final   • Triglycerides 04/08/2016 83  <150 mg/dL Final   • WBC 04/08/2016 4.2 THOUSAND/UL  3.8 - 10.8 K/uL Final   • A/G Ratio 04/08/2016 1.7 (calc)  1.0 - 2.5 Final   • Albumin 12/29/2017 4.6  3.6 - 5.1 g/dL Final   • Alkaline Phosphatase 12/29/2017 55  33 - 130 units/L  Final   • Basophils Absolute 12/29/2017 41 CELLS/UL  0 - 200 10*3/mm3 Final   • Basophil Rel % 12/29/2017 1.1  % Final   • Total Bilirubin 12/29/2017 0.5  0.2 - 1.2 mg/dL Final   • BUN 12/29/2017 12  7 - 25 mg/dL Final   • BUN/Creatinine Ratio 12/29/2017 NOT APPLICABLE (calc)  6 - 22 Final   • Calcium 12/29/2017 9.9  8.6 - 10.4 mg/dL Final   • Chloride 12/29/2017 103  98 - 110 mmol/L Final   • Chol/HDL Ratio 12/29/2017 3.0 (calc)  <5.0 Final   • Total Cholesterol 12/29/2017 238* <200 mg/dL Final   • CO2 CONTENT  12/29/2017 29  20 - 31 mmol/L Final   • Creatinine 12/29/2017 0.93  0.50 - 1.05 mg/dL Final   • eGFR African Am 12/29/2017 71  > OR = 60 mL/min/1.73m2 Final   • eGFR Non African Am 12/29/2017 82  > OR = 60 mL/min/1.73m2 Final   • Eosinophils Absolute 12/29/2017 81 CELLS/UL  15 - 500 10*3/mm3 Final   • Eosinophil Rel % 12/29/2017 2.2  % Final   • Globulin 12/29/2017 3.3 G/DL (CALC)  1.9 - 3.7 g/dL Final   • Glucose 12/29/2017 89  65 - 99 mg/dL Final   • Hematocrit 12/29/2017 43.4  35.0 - 45.0 % Final   • HDL Cholesterol 12/29/2017 79  >50 mg/dL Final   • Hemoglobin 12/29/2017 14.9  11.7 - 15.5 g/dL Final   • LDL Cholesterol  12/29/2017 140 MG/DL (CALC)* mg/dL Final   • Lymphocytes Absolute 12/29/2017 1147 CELLS/UL  850 - 3900 10*3/mm3 Final   • Lymphocyte Rel % 12/29/2017 31.0  % Final   • MCH 12/29/2017 29.5  27.0 - 33.0 pg Final   • MCHC 12/29/2017 34.3 G/DL  32.0 - 36.0 % Final   • MCV 12/29/2017 85.9  80.0 - 100.0 fL Final   • Monocyte Rel % 12/29/2017 10.3  % Final   • Monocytes Absolute 12/29/2017 381 CELLS/UL  200 - 950 10*3/microliter Final   • MPV 12/29/2017 11.1  7.5 - 12.5 fL Final   • Neutrophils Absolute 12/29/2017 2050 CELLS/UL  1500 - 7800 10*3/mm3 Final   • Platelets 12/29/2017 253 THOUSAND/UL  140 - 400 10*3/mm3 Final   • Neutrophils, Fluid 12/29/2017 55.4  % Final   • Potassium 12/29/2017 4.6  3.5 - 5.3 mmol/L Final   • Total Protein 12/29/2017 7.9  6.1 - 8.1 g/dL Final   • RBC 12/29/2017  5.05 MILLION/UL  3.80 - 5.10 10*6/mm3 Final   • RDW 12/29/2017 13.9  11.0 - 15.0 % Final   • AST (SGOT) 12/29/2017 18  10 - 35 units/L Final   • ALT (SGPT) 12/29/2017 18  6 - 29 units/L Final   • Sodium 12/29/2017 140  135 - 146 mmol/L Final   • Triglycerides 12/29/2017 87  <150 mg/dL Final   • TSH 12/29/2017 0.99  mIU/L Final   • WBC 12/29/2017 3.7 THOUSAND/UL* 3.8 - 10.8 K/uL Final   • A/G Ratio 12/29/2017 1.4 (calc)  1.0 - 2.5 Final   • Albumin 12/31/2018 4.5  3.6 - 5.1 g/dL Final   • Alkaline Phosphatase 12/31/2018 52  33 - 130 units/L Final   • Basophils Absolute 12/31/2018 32 CELLS/UL  0 - 200 10*3/mm3 Final   • Basophil Rel % 12/31/2018 0.8  % Final   • Total Bilirubin 12/31/2018 0.5  0.2 - 1.2 mg/dL Final   • BUN 12/31/2018 14  7 - 25 mg/dL Final   • BUN/Creatinine Ratio 12/31/2018 NOT APPLICABLE (calc)  6 - 22 Final   • Calcium 12/31/2018 9.7  8.6 - 10.4 mg/dL Final   • Chloride 12/31/2018 104  98 - 110 mmol/L Final   • Chol/HDL Ratio 12/31/2018 2.6 (calc)  <5.0 Final   • Total Cholesterol 12/31/2018 223* <200 mg/dL Final   • CO2 CONTENT  12/31/2018 27  20 - 32 mmol/L Final   • Creatinine 12/31/2018 0.79  0.50 - 1.05 mg/dL Final   • C-Reactive Protein 12/31/2018 0.04  Units converted. See lab report for original value. mg/dL Final   • eGFR African Am 12/31/2018 85  > OR = 60 mL/min/1.73m2 Final   • eGFR Non African Am 12/31/2018 99  > OR = 60 mL/min/1.73m2 Final   • Eosinophils Absolute 12/31/2018 112 CELLS/UL  15 - 500 10*3/mm3 Final   • Eosinophil Rel % 12/31/2018 2.8  % Final   • Sed Rate 12/31/2018 2  < OR = 30 mm/hr Final   • Globulin 12/31/2018 2.8 G/DL (CALC)  1.9 - 3.7 g/dL Final   • Glucose 12/31/2018 77  65 - 99 mg/dL Final   • Hematocrit 12/31/2018 40.0  35.0 - 45.0 % Final   • HDL Cholesterol 12/31/2018 86  >50 mg/dL Final   • Hemoglobin 12/31/2018 13.6  11.7 - 15.5 g/dL Final   • LDL Cholesterol  12/31/2018 122 MG/DL (CALC)* mg/dL Final   • Lymphocytes Absolute 12/31/2018 1060 CELLS/UL  850 -  3900 10*3/mm3 Final   • Lymphocyte Rel % 12/31/2018 26.5  % Final   • MCH 12/31/2018 30.0  27.0 - 33.0 pg Final   • MCHC 12/31/2018 34.0 G/DL  32.0 - 36.0 % Final   • MCV 12/31/2018 88.1  80.0 - 100.0 fL Final   • Monocyte Rel % 12/31/2018 9.6  % Final   • Monocytes Absolute 12/31/2018 384 CELLS/UL  200 - 950 10*3/microliter Final   • MPV 12/31/2018 11.7  7.5 - 12.5 fL Final   • Neutrophils Absolute 12/31/2018 2412 CELLS/UL  1500 - 7800 10*3/mm3 Final   • Platelets 12/31/2018 225 THOUSAND/UL  140 - 400 10*3/mm3 Final   • Neutrophils, Fluid 12/31/2018 60.3  % Final   • Potassium 12/31/2018 3.9  3.5 - 5.3 mmol/L Final   • Total Protein 12/31/2018 7.3  6.1 - 8.1 g/dL Final   • RBC 12/31/2018 4.54 MILLION/UL  3.80 - 5.10 10*6/mm3 Final   • RDW 12/31/2018 12.9  11.0 - 15.0 % Final   • RF Titer 12/31/2018 <14  <14 Final   • AST (SGOT) 12/31/2018 18  10 - 35 units/L Final   • ALT (SGPT) 12/31/2018 21  6 - 29 units/L Final   • Sodium 12/31/2018 140  135 - 146 mmol/L Final   • Triglycerides 12/31/2018 49  <150 mg/dL Final   • WBC 12/31/2018 4.0 THOUSAND/UL  3.8 - 10.8 K/uL Final   • A/G Ratio 12/31/2018 1.6 (calc)  1.0 - 2.5 Final   • Urine Culture 03/08/2019 **  Final         Review of Systems    Objective   Physical Exam    Procedures    Assessment/Plan   Diagnoses and all orders for this visit:    1. Injury of toe on right foot, initial encounter (Primary)  -     XR Foot 3+ View Right          Current Outpatient Medications:   •  Teriflunomide 14 MG tablet, Take 14 mg by mouth Daily., Disp: , Rfl:

## 2021-06-24 ENCOUNTER — TELEPHONE (OUTPATIENT)
Dept: FAMILY MEDICINE CLINIC | Facility: CLINIC | Age: 56
End: 2021-06-24

## 2021-06-24 NOTE — TELEPHONE ENCOUNTER
Patient called wanting to know if she can transfer care from  to you. She said the reason she switched was because her  changed doctors. She didn't care for s bedside manners and she is going through depression real bad right now, she stopped taking her depression medicine and said she needs back on it. Please advise

## 2021-06-29 ENCOUNTER — OFFICE VISIT (OUTPATIENT)
Dept: FAMILY MEDICINE CLINIC | Facility: CLINIC | Age: 56
End: 2021-06-29

## 2021-06-29 ENCOUNTER — TELEPHONE (OUTPATIENT)
Dept: FAMILY MEDICINE CLINIC | Facility: CLINIC | Age: 56
End: 2021-06-29

## 2021-06-29 VITALS
TEMPERATURE: 97.6 F | HEART RATE: 69 BPM | HEIGHT: 66 IN | SYSTOLIC BLOOD PRESSURE: 138 MMHG | DIASTOLIC BLOOD PRESSURE: 88 MMHG | WEIGHT: 135 LBS | OXYGEN SATURATION: 97 % | BODY MASS INDEX: 21.69 KG/M2

## 2021-06-29 DIAGNOSIS — Z13.220 LIPID SCREENING: Primary | ICD-10-CM

## 2021-06-29 DIAGNOSIS — Z00.00 PREVENTATIVE HEALTH CARE: ICD-10-CM

## 2021-06-29 DIAGNOSIS — L30.9 ECZEMA, UNSPECIFIED TYPE: ICD-10-CM

## 2021-06-29 DIAGNOSIS — F41.8 ANXIETY ASSOCIATED WITH DEPRESSION: ICD-10-CM

## 2021-06-29 PROCEDURE — 99214 OFFICE O/P EST MOD 30 MIN: CPT | Performed by: NURSE PRACTITIONER

## 2021-06-29 RX ORDER — BUPROPION HYDROCHLORIDE 300 MG/1
300 TABLET ORAL DAILY
Qty: 90 TABLET | Refills: 1 | Status: SHIPPED | OUTPATIENT
Start: 2021-06-29 | End: 2022-01-17 | Stop reason: SDUPTHER

## 2021-06-29 RX ORDER — RENAGEL 800 MG/1
1 TABLET ORAL DAILY
COMMUNITY
End: 2023-03-07

## 2021-06-29 RX ORDER — CLOBETASOL PROPIONATE 0.5 MG/G
OINTMENT TOPICAL 2 TIMES DAILY
Qty: 30 G | Refills: 2 | Status: SHIPPED | OUTPATIENT
Start: 2021-06-29 | End: 2022-01-17

## 2021-06-29 NOTE — PROGRESS NOTES
"    Cleine Musa is a 56 y.o. female.     57 yo white female with hx of ch pain, asthma, anxiety who comes in today with complaints of depression. She had lost her insurance and had been off her wellbutrin. I am reordering it for her. She denies any suicidal ideation.  She also complains of eye lids and under eye being red, and flaking and itching. Her upper lids are also swollen which she states is her normal. I ordered her some low dose steroid cream. It actually appears like an allergic reaction. If  Steroid cream does not work will refer to dermatology.  Blood pressure 138/88 heart rate 68 and she denies any chest pain, soa, dyspnea or dizziness  Weight is 135 with a bmi of 21.8  Patient is up to date on eye exam, she has had mammogram at [facility] which is not in our system and states she had a colonoscopy 5 years ago but I have no record of. She has an obgyn for her pap smears    wellbutrin XL 300mg qd  Clobetasol 0.05% ointment bid to eye area  Blood work today  Possible referral to dermatology  F/u 6 months       The following portions of the patient's history were reviewed and updated as appropriate: allergies, current medications, past family history, past medical history, past social history, past surgical history and problem list.    Vitals:    06/29/21 0811   BP: 138/88   BP Location: Right arm   Patient Position: Sitting   Cuff Size: Adult   Pulse: 69   Temp: 97.6 °F (36.4 °C)   TempSrc: Temporal   SpO2: 97%   Weight: 61.2 kg (135 lb)   Height: 167.6 cm (66\")     Body mass index is 21.79 kg/m².    Past Medical History:   Diagnosis Date   • Depression    • GERD (gastroesophageal reflux disease)    • Hyperlipidemia    • Multiple sclerosis (CMS/HCC)      Past Surgical History:   Procedure Laterality Date   • EYE SURGERY     • KNEE ARTHROSCOPY Left      Family History   Problem Relation Age of Onset   • Hyperlipidemia Mother    • Hyperlipidemia Father    • Heart disease Father    • " Leukemia Father    • No Known Problems Son    • No Known Problems Half-Sister    • No Known Problems Half-Sister    • No Known Problems Son      Immunization History   Administered Date(s) Administered   • Flu Vaccine Quad PF 6-35MO 12/14/2017   • H1N1 Inj 11/14/2009   • Tdap 06/24/2018       Conversion Encounter on 04/08/2016   Component Date Value Ref Range Status   • Albumin 04/08/2016 4.6  3.6 - 5.1 g/dL Final   • Alkaline Phosphatase 04/08/2016 51  33 - 130 units/L Final   • Basophils Absolute 04/08/2016 17 CELLS/UL  0 - 200 10*3/mm3 Final   • Basophil Rel % 04/08/2016 0.4  % Final   • Total Bilirubin 04/08/2016 0.6  0.2 - 1.2 mg/dL Final   • BUN 04/08/2016 16  7 - 25 mg/dL Final   • BUN/Creatinine Ratio 04/08/2016 NOT APPLICABLE (calc)  6 - 22 Final   • Calcium 04/08/2016 9.8  8.6 - 10.4 mg/dL Final   • Chloride 04/08/2016 103  98 - 110 mmol/L Final   • Chol/HDL Ratio 04/08/2016 3.3 (calc)  < OR = 5.0 Final   • Total Cholesterol 04/08/2016 216* 125 - 200 mg/dL Final   • CO2 CONTENT  04/08/2016 26  19 - 30 mmol/L Final   • Creatinine 04/08/2016 0.84  0.50 - 1.05 mg/dL Final   • eGFR African Am 04/08/2016 80  > OR = 60 mL/min/1.73m2 Final   • eGFR Non African Am 04/08/2016 93  > OR = 60 mL/min/1.73m2 Final   • Eosinophils Absolute 04/08/2016 118 CELLS/UL  15 - 500 10*3/mm3 Final   • Eosinophil Rel % 04/08/2016 2.8  % Final   • Globulin 04/08/2016 2.7 G/DL (CALC)  1.9 - 3.7 g/dL Final   • Glucose 04/08/2016 85  65 - 99 mg/dL Final   • Hematocrit 04/08/2016 43.0  35.0 - 45.0 % Final   • HDL Cholesterol 04/08/2016 66  > OR = 46 mg/dL Final   • Hemoglobin 04/08/2016 14.4  11.7 - 15.5 g/dL Final   • LDL Cholesterol  04/08/2016 133 MG/DL (CALC)* <130 mg/dL Final   • Lymphocytes Absolute 04/08/2016 1340 CELLS/UL  850 - 3900 10*3/mm3 Final   • Lymphocyte Rel % 04/08/2016 31.9  % Final   • MCH 04/08/2016 29.3  27.0 - 33.0 pg Final   • MCHC 04/08/2016 33.4 G/DL  32.0 - 36.0 % Final   • MCV 04/08/2016 87.8  80.0 - 100.0 fL  Final   • Monocyte Rel % 04/08/2016 7.5  % Final   • Monocytes Absolute 04/08/2016 315 CELLS/UL  200 - 950 10*3/microliter Final   • MPV 04/08/2016 10.1  7.5 - 11.5 fL Final   • Neutrophils Absolute 04/08/2016 2411 CELLS/UL  1500 - 7800 10*3/mm3 Final   • Platelets 04/08/2016 181 THOUSAND/UL  140 - 400 10*3/mm3 Final   • Neutrophils, Fluid 04/08/2016 57.4  % Final   • Potassium 04/08/2016 4.6  3.5 - 5.3 mmol/L Final   • Total Protein 04/08/2016 7.3  6.1 - 8.1 g/dL Final   • RBC 04/08/2016 4.90 MILLION/UL  3.80 - 5.10 10*6/mm3 Final   • RDW 04/08/2016 16.2* 11.0 - 15.0 % Final   • AST (SGOT) 04/08/2016 14  10 - 35 units/L Final   • ALT (SGPT) 04/08/2016 16  6 - 29 units/L Final   • Sodium 04/08/2016 138  135 - 146 mmol/L Final   • Triglycerides 04/08/2016 83  <150 mg/dL Final   • WBC 04/08/2016 4.2 THOUSAND/UL  3.8 - 10.8 K/uL Final   • A/G Ratio 04/08/2016 1.7 (calc)  1.0 - 2.5 Final   • Albumin 12/29/2017 4.6  3.6 - 5.1 g/dL Final   • Alkaline Phosphatase 12/29/2017 55  33 - 130 units/L Final   • Basophils Absolute 12/29/2017 41 CELLS/UL  0 - 200 10*3/mm3 Final   • Basophil Rel % 12/29/2017 1.1  % Final   • Total Bilirubin 12/29/2017 0.5  0.2 - 1.2 mg/dL Final   • BUN 12/29/2017 12  7 - 25 mg/dL Final   • BUN/Creatinine Ratio 12/29/2017 NOT APPLICABLE (calc)  6 - 22 Final   • Calcium 12/29/2017 9.9  8.6 - 10.4 mg/dL Final   • Chloride 12/29/2017 103  98 - 110 mmol/L Final   • Chol/HDL Ratio 12/29/2017 3.0 (calc)  <5.0 Final   • Total Cholesterol 12/29/2017 238* <200 mg/dL Final   • CO2 CONTENT  12/29/2017 29  20 - 31 mmol/L Final   • Creatinine 12/29/2017 0.93  0.50 - 1.05 mg/dL Final   • eGFR African Am 12/29/2017 71  > OR = 60 mL/min/1.73m2 Final   • eGFR Non African Am 12/29/2017 82  > OR = 60 mL/min/1.73m2 Final   • Eosinophils Absolute 12/29/2017 81 CELLS/UL  15 - 500 10*3/mm3 Final   • Eosinophil Rel % 12/29/2017 2.2  % Final   • Globulin 12/29/2017 3.3 G/DL (CALC)  1.9 - 3.7 g/dL Final   • Glucose 12/29/2017  89  65 - 99 mg/dL Final   • Hematocrit 12/29/2017 43.4  35.0 - 45.0 % Final   • HDL Cholesterol 12/29/2017 79  >50 mg/dL Final   • Hemoglobin 12/29/2017 14.9  11.7 - 15.5 g/dL Final   • LDL Cholesterol  12/29/2017 140 MG/DL (CALC)* mg/dL Final   • Lymphocytes Absolute 12/29/2017 1147 CELLS/UL  850 - 3900 10*3/mm3 Final   • Lymphocyte Rel % 12/29/2017 31.0  % Final   • MCH 12/29/2017 29.5  27.0 - 33.0 pg Final   • MCHC 12/29/2017 34.3 G/DL  32.0 - 36.0 % Final   • MCV 12/29/2017 85.9  80.0 - 100.0 fL Final   • Monocyte Rel % 12/29/2017 10.3  % Final   • Monocytes Absolute 12/29/2017 381 CELLS/UL  200 - 950 10*3/microliter Final   • MPV 12/29/2017 11.1  7.5 - 12.5 fL Final   • Neutrophils Absolute 12/29/2017 2050 CELLS/UL  1500 - 7800 10*3/mm3 Final   • Platelets 12/29/2017 253 THOUSAND/UL  140 - 400 10*3/mm3 Final   • Neutrophils, Fluid 12/29/2017 55.4  % Final   • Potassium 12/29/2017 4.6  3.5 - 5.3 mmol/L Final   • Total Protein 12/29/2017 7.9  6.1 - 8.1 g/dL Final   • RBC 12/29/2017 5.05 MILLION/UL  3.80 - 5.10 10*6/mm3 Final   • RDW 12/29/2017 13.9  11.0 - 15.0 % Final   • AST (SGOT) 12/29/2017 18  10 - 35 units/L Final   • ALT (SGPT) 12/29/2017 18  6 - 29 units/L Final   • Sodium 12/29/2017 140  135 - 146 mmol/L Final   • Triglycerides 12/29/2017 87  <150 mg/dL Final   • TSH 12/29/2017 0.99  mIU/L Final   • WBC 12/29/2017 3.7 THOUSAND/UL* 3.8 - 10.8 K/uL Final   • A/G Ratio 12/29/2017 1.4 (calc)  1.0 - 2.5 Final   • Albumin 12/31/2018 4.5  3.6 - 5.1 g/dL Final   • Alkaline Phosphatase 12/31/2018 52  33 - 130 units/L Final   • Basophils Absolute 12/31/2018 32 CELLS/UL  0 - 200 10*3/mm3 Final   • Basophil Rel % 12/31/2018 0.8  % Final   • Total Bilirubin 12/31/2018 0.5  0.2 - 1.2 mg/dL Final   • BUN 12/31/2018 14  7 - 25 mg/dL Final   • BUN/Creatinine Ratio 12/31/2018 NOT APPLICABLE (calc)  6 - 22 Final   • Calcium 12/31/2018 9.7  8.6 - 10.4 mg/dL Final   • Chloride 12/31/2018 104  98 - 110 mmol/L Final   •  Chol/HDL Ratio 12/31/2018 2.6 (calc)  <5.0 Final   • Total Cholesterol 12/31/2018 223* <200 mg/dL Final   • CO2 CONTENT  12/31/2018 27  20 - 32 mmol/L Final   • Creatinine 12/31/2018 0.79  0.50 - 1.05 mg/dL Final   • C-Reactive Protein 12/31/2018 0.04  Units converted. See lab report for original value. mg/dL Final   • eGFR African Am 12/31/2018 85  > OR = 60 mL/min/1.73m2 Final   • eGFR Non African Am 12/31/2018 99  > OR = 60 mL/min/1.73m2 Final   • Eosinophils Absolute 12/31/2018 112 CELLS/UL  15 - 500 10*3/mm3 Final   • Eosinophil Rel % 12/31/2018 2.8  % Final   • Sed Rate 12/31/2018 2  < OR = 30 mm/hr Final   • Globulin 12/31/2018 2.8 G/DL (CALC)  1.9 - 3.7 g/dL Final   • Glucose 12/31/2018 77  65 - 99 mg/dL Final   • Hematocrit 12/31/2018 40.0  35.0 - 45.0 % Final   • HDL Cholesterol 12/31/2018 86  >50 mg/dL Final   • Hemoglobin 12/31/2018 13.6  11.7 - 15.5 g/dL Final   • LDL Cholesterol  12/31/2018 122 MG/DL (CALC)* mg/dL Final   • Lymphocytes Absolute 12/31/2018 1060 CELLS/UL  850 - 3900 10*3/mm3 Final   • Lymphocyte Rel % 12/31/2018 26.5  % Final   • MCH 12/31/2018 30.0  27.0 - 33.0 pg Final   • MCHC 12/31/2018 34.0 G/DL  32.0 - 36.0 % Final   • MCV 12/31/2018 88.1  80.0 - 100.0 fL Final   • Monocyte Rel % 12/31/2018 9.6  % Final   • Monocytes Absolute 12/31/2018 384 CELLS/UL  200 - 950 10*3/microliter Final   • MPV 12/31/2018 11.7  7.5 - 12.5 fL Final   • Neutrophils Absolute 12/31/2018 2412 CELLS/UL  1500 - 7800 10*3/mm3 Final   • Platelets 12/31/2018 225 THOUSAND/UL  140 - 400 10*3/mm3 Final   • Neutrophils, Fluid 12/31/2018 60.3  % Final   • Potassium 12/31/2018 3.9  3.5 - 5.3 mmol/L Final   • Total Protein 12/31/2018 7.3  6.1 - 8.1 g/dL Final   • RBC 12/31/2018 4.54 MILLION/UL  3.80 - 5.10 10*6/mm3 Final   • RDW 12/31/2018 12.9  11.0 - 15.0 % Final   • RF Titer 12/31/2018 <14  <14 Final   • AST (SGOT) 12/31/2018 18  10 - 35 units/L Final   • ALT (SGPT) 12/31/2018 21  6 - 29 units/L Final   • Sodium  12/31/2018 140  135 - 146 mmol/L Final   • Triglycerides 12/31/2018 49  <150 mg/dL Final   • WBC 12/31/2018 4.0 THOUSAND/UL  3.8 - 10.8 K/uL Final   • A/G Ratio 12/31/2018 1.6 (calc)  1.0 - 2.5 Final   • Urine Culture 03/08/2019 **  Final         Review of Systems   Constitutional: Negative.    HENT: Negative.    Respiratory: Negative.    Cardiovascular: Negative.    Gastrointestinal: Negative.    Genitourinary: Negative.    Musculoskeletal: Negative.    Skin:        Eye lids dry and puffy   Neurological: Negative.    Psychiatric/Behavioral: Positive for depressed mood.       Objective   Physical Exam  Constitutional:       Appearance: Normal appearance.   HENT:      Head: Normocephalic.   Cardiovascular:      Rate and Rhythm: Normal rate and regular rhythm.      Pulses: Normal pulses.      Heart sounds: Normal heart sounds.   Pulmonary:      Effort: Pulmonary effort is normal.      Breath sounds: Normal breath sounds.   Abdominal:      General: Bowel sounds are normal.   Musculoskeletal:         General: Normal range of motion.      Cervical back: Normal range of motion.   Skin:     Comments: Upper eye lids puffy, dry and red and under eye area red   Neurological:      General: No focal deficit present.      Mental Status: She is alert and oriented to person, place, and time.   Psychiatric:         Mood and Affect: Mood normal.         Behavior: Behavior normal.         Procedures    Assessment/Plan   Diagnoses and all orders for this visit:    1. Lipid screening (Primary)  -     Lipid Panel With LDL / HDL Ratio    2. Preventative health care  -     Comprehensive Metabolic Panel    3. Anxiety associated with depression    4. Eczema, unspecified type    Other orders  -     clobetasol (TEMOVATE) 0.05 % ointment; Apply  topically to the appropriate area as directed 2 (Two) Times a Day.  Dispense: 30 g; Refill: 2  -     buPROPion XL (WELLBUTRIN XL) 300 MG 24 hr tablet; Take 1 tablet by mouth Daily.  Dispense: 90  tablet; Refill: 1          Current Outpatient Medications:   •  Teriflunomide (Aubagio) 14 MG tablet, Take 1 tablet by mouth Daily., Disp: , Rfl:   •  buPROPion XL (WELLBUTRIN XL) 300 MG 24 hr tablet, Take 1 tablet by mouth Daily., Disp: 90 tablet, Rfl: 1  •  clobetasol (TEMOVATE) 0.05 % ointment, Apply  topically to the appropriate area as directed 2 (Two) Times a Day., Disp: 30 g, Rfl: 2

## 2021-06-29 NOTE — TELEPHONE ENCOUNTER
PLEASE TELL Memetales THAT WE HAVE HER INSURANCE INFORMATION AND TO PROCESS THE BLOOD SPECIMEN.    CALL BACK #: [phone]

## 2021-06-29 NOTE — PATIENT INSTRUCTIONS
wellbutrin XL 300mg qd  Clobetasol 0.05% ointment bid to eye area  Blood work today  Possible referral to dermatology  F/u 6 months

## 2021-06-30 LAB
ALBUMIN SERPL-MCNC: 4.7 G/DL (ref 3.8–4.9)
ALBUMIN/GLOB SERPL: 2 {RATIO} (ref 1.2–2.2)
ALP SERPL-CCNC: 54 IU/L (ref 48–121)
ALT SERPL-CCNC: 17 IU/L (ref 0–32)
AST SERPL-CCNC: 16 IU/L (ref 0–40)
BILIRUB SERPL-MCNC: 0.3 MG/DL (ref 0–1.2)
BUN SERPL-MCNC: 19 MG/DL (ref 6–24)
BUN/CREAT SERPL: 24 (ref 9–23)
CALCIUM SERPL-MCNC: 10 MG/DL (ref 8.7–10.2)
CHLORIDE SERPL-SCNC: 107 MMOL/L (ref 96–106)
CHOLEST SERPL-MCNC: 223 MG/DL (ref 100–199)
CO2 SERPL-SCNC: 26 MMOL/L (ref 20–29)
CREAT SERPL-MCNC: 0.79 MG/DL (ref 0.57–1)
GLOBULIN SER CALC-MCNC: 2.4 G/DL (ref 1.5–4.5)
GLUCOSE SERPL-MCNC: 91 MG/DL (ref 65–99)
HDLC SERPL-MCNC: 71 MG/DL
LDLC SERPL CALC-MCNC: 136 MG/DL (ref 0–99)
LDLC/HDLC SERPL: 1.9 RATIO (ref 0–3.2)
POTASSIUM SERPL-SCNC: 5.2 MMOL/L (ref 3.5–5.2)
PROT SERPL-MCNC: 7.1 G/DL (ref 6–8.5)
SODIUM SERPL-SCNC: 145 MMOL/L (ref 134–144)
TRIGL SERPL-MCNC: 92 MG/DL (ref 0–149)
VLDLC SERPL CALC-MCNC: 16 MG/DL (ref 5–40)

## 2021-07-05 RX ORDER — PRAVASTATIN SODIUM 10 MG
10 TABLET ORAL DAILY
Qty: 30 TABLET | Refills: 2 | Status: SHIPPED | OUTPATIENT
Start: 2021-07-05 | End: 2022-01-17 | Stop reason: SDUPTHER

## 2021-09-08 ENCOUNTER — OFFICE VISIT (OUTPATIENT)
Dept: FAMILY MEDICINE CLINIC | Facility: CLINIC | Age: 56
End: 2021-09-08

## 2021-09-08 VITALS
BODY MASS INDEX: 20.76 KG/M2 | WEIGHT: 129.2 LBS | DIASTOLIC BLOOD PRESSURE: 72 MMHG | HEIGHT: 66 IN | TEMPERATURE: 97.3 F | HEART RATE: 75 BPM | SYSTOLIC BLOOD PRESSURE: 127 MMHG | OXYGEN SATURATION: 97 %

## 2021-09-08 DIAGNOSIS — S62.616A CLOSED DISPLACED FRACTURE OF PROXIMAL PHALANX OF RIGHT LITTLE FINGER, INITIAL ENCOUNTER: Primary | ICD-10-CM

## 2021-09-08 PROCEDURE — 99213 OFFICE O/P EST LOW 20 MIN: CPT | Performed by: NURSE PRACTITIONER

## 2021-09-08 RX ORDER — BRIMONIDINE TARTRATE 0.1 %
1 DROPS OPHTHALMIC (EYE) 2 TIMES DAILY
COMMUNITY
Start: 2021-09-01
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
## 2021-09-09 ENCOUNTER — TELEPHONE (OUTPATIENT)
Dept: FAMILY MEDICINE CLINIC | Facility: CLINIC | Age: 56
End: 2021-09-09
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
## 2021-09-10 ENCOUNTER — TELEPHONE (OUTPATIENT)
Dept: ORTHOPEDIC SURGERY | Facility: CLINIC | Age: 56
End: 2021-09-10

## 2021-09-10 ENCOUNTER — TELEPHONE (OUTPATIENT)
Dept: FAMILY MEDICINE CLINIC | Facility: CLINIC | Age: 56
End: 2021-09-10

## 2021-09-10 NOTE — TELEPHONE ENCOUNTER
PATIENT CAN'T BE SEEN IN ORTHO UNTIL 09/16.  WOULD LIKE TO KNOW IF THERE IS SOMEONE IN [facility] OR [facility] THAT CAN SEE HER SOONER WOULD ALSO  LIKE TO KNOW IF SOMETHING CAN BE PRESCRIBED FOR THE PAIN      [phone]    [facility] [address] [facility], [place] - [address] [facility] [phone] [facility] [phone] FX
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
## 2021-09-12 RX ORDER — TRAMADOL HYDROCHLORIDE 50 MG/1
50 TABLET ORAL EVERY 6 HOURS PRN
Qty: 30 TABLET | Refills: 0 | Status: SHIPPED | OUTPATIENT
Start: 2021-09-12 | End: 2021-10-27

## 2021-09-12 NOTE — TELEPHONE ENCOUNTER
That is next week she has not gone to get in anywhere else sooner than that I will call her in some tramadol till she sees the doctor

## 2021-09-16 ENCOUNTER — OFFICE VISIT (OUTPATIENT)
Dept: ORTHOPEDIC SURGERY | Facility: CLINIC | Age: 56
End: 2021-09-16

## 2021-09-16 VITALS
WEIGHT: 129 LBS | DIASTOLIC BLOOD PRESSURE: 74 MMHG | HEIGHT: 66 IN | BODY MASS INDEX: 20.73 KG/M2 | HEART RATE: 80 BPM | SYSTOLIC BLOOD PRESSURE: 110 MMHG

## 2021-09-16 DIAGNOSIS — M79.641 RIGHT HAND PAIN: Primary | ICD-10-CM

## 2021-09-16 DIAGNOSIS — S62.336A CLOSED DISPLACED FRACTURE OF NECK OF FIFTH METACARPAL BONE OF RIGHT HAND, INITIAL ENCOUNTER: ICD-10-CM

## 2021-09-16 PROCEDURE — 99203 OFFICE O/P NEW LOW 30 MIN: CPT | Performed by: ORTHOPAEDIC SURGERY
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
## 2021-10-07 ENCOUNTER — OFFICE VISIT (OUTPATIENT)
Dept: ORTHOPEDIC SURGERY | Facility: CLINIC | Age: 56
End: 2021-10-07

## 2021-10-07 VITALS
HEIGHT: 66 IN | HEART RATE: 75 BPM | WEIGHT: 129 LBS | SYSTOLIC BLOOD PRESSURE: 110 MMHG | BODY MASS INDEX: 20.73 KG/M2 | DIASTOLIC BLOOD PRESSURE: 67 MMHG

## 2021-10-07 DIAGNOSIS — S62.336A CLOSED DISPLACED FRACTURE OF NECK OF FIFTH METACARPAL BONE OF RIGHT HAND, INITIAL ENCOUNTER: ICD-10-CM

## 2021-10-07 DIAGNOSIS — M79.641 RIGHT HAND PAIN: Primary | ICD-10-CM

## 2021-10-07 PROCEDURE — 99212 OFFICE O/P EST SF 10 MIN: CPT | Performed by: ORTHOPAEDIC SURGERY

## 2021-10-07 NOTE — PROGRESS NOTES
"     Patient ID: Celine Musa is a 56 y.o. female.  Right hand pain  Follow-up for right fifth metacarpal fracture suffered on approximately August 25, treated conservatively  Pain minimal  Review of Systems:    Right hand pain improving    Objective:    /67   Pulse 75   Ht 167.6 cm (66\")   Wt 58.5 kg (129 lb)   LMP  (LMP Unknown)   BMI 20.82 kg/m²     Physical Examination:  Right hand demonstrates a painless bump over the fifth metacarpal neck. No rotational abnormality full range motion of the digit       Imaging:   right Hand X-Ray  Indication: Fifth metacarpal fracture approximately 6 weeks ago  AP, Oblique, Lateral views  Findings: Visible fracture line with abundant callus no change in alignment fifth metacarpal neck fracture  no bony lesion  Soft tissues normal  not examined joint spaces  Hardware appropriately positioned not applicable      no prior studies available for comparison    Assessment:    Healed right fifth metacarpal fracture    Plan:   Activity as tolerated and see me as needed      Procedures          Disclaimer: Please note that areas of this note were completed with computer voice recognition software.  Quite often unanticipated grammatical, syntax, homophones, and other interpretive errors are inadvertently transcribed by the computer software. Please excuse any errors that have escaped final proofreading.  "

## 2021-10-27 RX ORDER — TRAMADOL HYDROCHLORIDE 50 MG/1
TABLET ORAL
Qty: 30 TABLET | Refills: 0 | Status: SHIPPED | OUTPATIENT
Start: 2021-10-27 | End: 2021-11-02

## 2021-11-02 RX ORDER — TRAMADOL HYDROCHLORIDE 50 MG/1
TABLET ORAL
Qty: 30 TABLET | Refills: 0 | Status: SHIPPED | OUTPATIENT
Start: 2021-11-02 | End: 2022-01-17

## 2022-01-17 ENCOUNTER — OFFICE VISIT (OUTPATIENT)
Dept: FAMILY MEDICINE CLINIC | Facility: CLINIC | Age: 57
End: 2022-01-17

## 2022-01-17 ENCOUNTER — TELEPHONE (OUTPATIENT)
Dept: FAMILY MEDICINE CLINIC | Facility: CLINIC | Age: 57
End: 2022-01-17

## 2022-01-17 VITALS
OXYGEN SATURATION: 97 % | HEART RATE: 92 BPM | SYSTOLIC BLOOD PRESSURE: 125 MMHG | WEIGHT: 128.8 LBS | HEIGHT: 66 IN | DIASTOLIC BLOOD PRESSURE: 82 MMHG | BODY MASS INDEX: 20.7 KG/M2 | TEMPERATURE: 97.6 F

## 2022-01-17 DIAGNOSIS — T14.8XXA SCRATCHES: ICD-10-CM

## 2022-01-17 DIAGNOSIS — N39.0 ACUTE URINARY TRACT INFECTION: Primary | ICD-10-CM

## 2022-01-17 PROBLEM — H16.229 KERATOCONJUNCTIVITIS SICCA NOT DUE TO SJOGREN'S SYNDROME: Status: ACTIVE | Noted: 2020-12-16

## 2022-01-17 PROBLEM — H40.1130 PRIMARY OPEN ANGLE GLAUCOMA (POAG) OF BOTH EYES: Status: ACTIVE | Noted: 2017-11-27

## 2022-01-17 LAB
BILIRUB BLD-MCNC: NEGATIVE MG/DL
CLARITY, POC: ABNORMAL
COLOR UR: YELLOW
EXPIRATION DATE: ABNORMAL
GLUCOSE UR STRIP-MCNC: NEGATIVE MG/DL
KETONES UR QL: ABNORMAL
LEUKOCYTE EST, POC: ABNORMAL
Lab: ABNORMAL
NITRITE UR-MCNC: NEGATIVE MG/ML
PH UR: 6 [PH] (ref 5–8)
PROT UR STRIP-MCNC: NEGATIVE MG/DL
RBC # UR STRIP: NEGATIVE /UL
SP GR UR: 1.02 (ref 1–1.03)
UROBILINOGEN UR QL: NORMAL

## 2022-01-17 PROCEDURE — 81003 URINALYSIS AUTO W/O SCOPE: CPT | Performed by: NURSE PRACTITIONER

## 2022-01-17 PROCEDURE — 99213 OFFICE O/P EST LOW 20 MIN: CPT | Performed by: NURSE PRACTITIONER

## 2022-01-17 RX ORDER — ERGOCALCIFEROL 1.25 MG/1
1 CAPSULE ORAL
COMMUNITY
Start: 2021-12-14 | End: 2022-03-14

## 2022-01-17 RX ORDER — PRAVASTATIN SODIUM 10 MG
10 TABLET ORAL DAILY
Qty: 30 TABLET | Refills: 2 | Status: SHIPPED | OUTPATIENT
Start: 2022-01-17

## 2022-01-17 RX ORDER — SULFAMETHOXAZOLE AND TRIMETHOPRIM 800; 160 MG/1; MG/1
1 TABLET ORAL 2 TIMES DAILY
Qty: 14 TABLET | Refills: 0 | Status: SHIPPED | OUTPATIENT
Start: 2022-01-17 | End: 2022-01-24

## 2022-01-17 RX ORDER — PREDNISONE 10 MG/1
TABLET ORAL
Qty: 19 TABLET | Refills: 0 | Status: CANCELLED | OUTPATIENT
Start: 2022-01-17 | End: 2022-01-29

## 2022-01-17 RX ORDER — BUPROPION HYDROCHLORIDE 300 MG/1
300 TABLET ORAL DAILY
Qty: 90 TABLET | Refills: 1 | Status: SHIPPED | OUTPATIENT
Start: 2022-01-17 | End: 2023-02-10 | Stop reason: SDUPTHER

## 2022-01-17 RX ORDER — CYCLOBENZAPRINE HCL 10 MG
10 TABLET ORAL NIGHTLY PRN
Qty: 30 TABLET | Refills: 1 | Status: CANCELLED | OUTPATIENT
Start: 2022-01-17

## 2022-01-17 RX ORDER — MELOXICAM 15 MG/1
15 TABLET ORAL DAILY
Qty: 30 TABLET | Refills: 1 | Status: CANCELLED | OUTPATIENT
Start: 2022-01-17

## 2022-01-17 NOTE — TELEPHONE ENCOUNTER
The antibiotic is called in and I told her we did not need to do anything for the cat scratches besides she is on antibiotic for the UTI that will cover any infection on the hand.  Patient has some memory issues

## 2022-01-17 NOTE — PATIENT INSTRUCTIONS
Bactrim DS twice daily x7 days  Increase fluid intake  Follow-up with urine 1 week after last antibiotic dose  Monitor scratches on hand for any signs of infection

## 2022-01-17 NOTE — TELEPHONE ENCOUNTER
While leaving the office today, pt states she was told she has a UTI but no medication has been called in for it. Pt also states she mentioned to lyndsey she has a spot on the back of her hand where her cat's claw punctured her vein, and she wasn't sure if something was being called in for that as well. Please call pt to advise.

## 2022-01-17 NOTE — PROGRESS NOTES
"    Celine Musa is a 56 y.o. female.     56-year-old white female with history of chronic pain, asthma, anxiety who comes in today with complaints of right flank pain.  She denies any injury and states pain is the same at rest as it is with movement.  Urinalysis revealed UTI +2 bacteria and blood and I am placing her on Bactrim and told her to increase her fluid intake    Patient also has scratches on her right hand where her cat scratched her but there is no signs symptoms of infection I told her to keep it clean with antibacterial soap and have me know if the hand starts to swell or it looks worse     blood pressure 124/82 heart rate 92 she denies any chest pain, dyspnea, tachycardia or dizziness                  Bactrim DS twice daily x7 days  Increase fluid intake  Follow-up with urine 1 week after last antibiotic dose  Monitor scratches on hand for any signs of infection         The following portions of the patient's history were reviewed and updated as appropriate: allergies, current medications, past family history, past medical history, past social history, past surgical history and problem list.    Vitals:    01/17/22 1321   BP: 125/82   BP Location: Right arm   Patient Position: Sitting   Cuff Size: Adult   Pulse: 92   Temp: 97.6 °F (36.4 °C)   TempSrc: Temporal   SpO2: 97%   Weight: 58.4 kg (128 lb 12.8 oz)   Height: 167.6 cm (66\")     Body mass index is 20.79 kg/m².    Past Medical History:   Diagnosis Date   • Depression    • GERD (gastroesophageal reflux disease)    • Hyperlipidemia    • Multiple sclerosis (HCC)      Past Surgical History:   Procedure Laterality Date   • EYE SURGERY     • KNEE ARTHROSCOPY Left      Family History   Problem Relation Age of Onset   • Hyperlipidemia Mother    • Hyperlipidemia Father    • Heart disease Father    • Leukemia Father    • No Known Problems Son    • No Known Problems Half-Sister    • No Known Problems Half-Sister    • No Known Problems Son  "     Immunization History   Administered Date(s) Administered   • Flu Vaccine Quad PF 6-35MO 12/14/2017   • H1N1 Inj 11/14/2009   • Tdap 06/24/2018       Office Visit on 06/29/2021   Component Date Value Ref Range Status   • Total Cholesterol 06/29/2021 223* 100 - 199 mg/dL Final   • Triglycerides 06/29/2021 92  0 - 149 mg/dL Final   • HDL Cholesterol 06/29/2021 71  >39 mg/dL Final   • VLDL Cholesterol [place] 06/29/2021 16  5 - 40 mg/dL Final   • LDL Chol Calc ([facility]) 06/29/2021 136* 0 - 99 mg/dL Final   • LDL/HDL RATIO 06/29/2021 1.9  0.0 - 3.2 ratio Final    Comment:                                     LDL/HDL Ratio                                              Men  Women                                1/2 Avg.Risk  1.0    1.5                                    Avg.Risk  3.6    3.2                                 2X Avg.Risk  6.2    5.0                                 3X Avg.Risk  8.0    6.1     • Glucose 06/29/2021 91  65 - 99 mg/dL Final   • BUN 06/29/2021 19  6 - 24 mg/dL Final   • Creatinine 06/29/2021 0.79  0.57 - 1.00 mg/dL Final   • eGFR Non  Am 06/29/2021 84  >59 mL/min/1.73 Final   • eGFR African Am 06/29/2021 97  >59 mL/min/1.73 Final    Comment: **[facility] currently reports eGFR in compliance with the current**    recommendations of the [employer]. [facility] will    update reporting as new guidelines are published from the NKF-ASN    Task force.     • BUN/Creatinine Ratio 06/29/2021 24* 9 - 23 Final   • Sodium 06/29/2021 145* 134 - 144 mmol/L Final   • Potassium 06/29/2021 5.2  3.5 - 5.2 mmol/L Final   • Chloride 06/29/2021 107* 96 - 106 mmol/L Final   • Total CO2 06/29/2021 26  20 - 29 mmol/L Final   • Calcium 06/29/2021 10.0  8.7 - 10.2 mg/dL Final   • Total Protein 06/29/2021 7.1  6.0 - 8.5 g/dL Final   • Albumin 06/29/2021 4.7  3.8 - 4.9 g/dL Final   • Globulin 06/29/2021 2.4  1.5 - 4.5 g/dL Final   • A/G Ratio 06/29/2021 2.0  1.2 - 2.2 Final   • Total Bilirubin 06/29/2021 0.3  0.0 -  1.2 mg/dL Final   • Alkaline Phosphatase 06/29/2021 54  48 - 121 IU/L Final   • AST (SGOT) 06/29/2021 16  0 - 40 IU/L Final   • ALT (SGPT) 06/29/2021 17  0 - 32 IU/L Final         Review of Systems   Constitutional: Negative.    HENT: Negative.    Respiratory: Negative.    Cardiovascular: Negative.    Gastrointestinal: Negative.    Genitourinary: Positive for flank pain.   Skin:        Cat scratches on top of right hand   Neurological: Negative.        Objective   Physical Exam  Constitutional:       Appearance: Normal appearance.   HENT:      Head: Normocephalic.   Cardiovascular:      Rate and Rhythm: Normal rate and regular rhythm.      Pulses: Normal pulses.      Heart sounds: Normal heart sounds.   Pulmonary:      Effort: Pulmonary effort is normal.   Abdominal:      General: Bowel sounds are normal.   Musculoskeletal:         General: Normal range of motion.   Skin:     General: Skin is warm and dry.   Neurological:      General: No focal deficit present.      Mental Status: She is alert and oriented to person, place, and time.   Psychiatric:         Mood and Affect: Mood normal.         Behavior: Behavior normal.         Procedures    Assessment/Plan   Diagnoses and all orders for this visit:    1. Acute urinary tract infection (Primary)    2. Scratches    Other orders  -     buPROPion XL (WELLBUTRIN XL) 300 MG 24 hr tablet; Take 1 tablet by mouth Daily.  Dispense: 90 tablet; Refill: 1  -     pravastatin (PRAVACHOL) 10 MG tablet; Take 1 tablet by mouth Daily.  Dispense: 30 tablet; Refill: 2          Current Outpatient Medications:   •  Alphagan P 0.1 % solution ophthalmic solution, Administer 1 drop to both eyes 2 (two) times a day., Disp: , Rfl:   •  buPROPion XL (WELLBUTRIN XL) 300 MG 24 hr tablet, Take 1 tablet by mouth Daily., Disp: 90 tablet, Rfl: 1  •  ergocalciferol (ERGOCALCIFEROL) 1.25 MG (12236 UT) capsule, Take 1 capsule by mouth Every 7 (Seven) Days., Disp: , Rfl:   •  pravastatin (PRAVACHOL) 10  MG tablet, Take 1 tablet by mouth Daily., Disp: 30 tablet, Rfl: 2  •  Teriflunomide (Aubagio) 14 MG tablet, Take 1 tablet by mouth Daily., Disp: , Rfl:            Chantale Julien, APRN1/17/202214:53 EST  This note has been electronically signed

## 2022-01-19 ENCOUNTER — TELEPHONE (OUTPATIENT)
Dept: FAMILY MEDICINE CLINIC | Facility: CLINIC | Age: 57
End: 2022-01-19

## 2022-01-19 LAB
BACTERIA UR CULT: NORMAL
BACTERIA UR CULT: NORMAL

## 2022-01-19 NOTE — TELEPHONE ENCOUNTER
Pt calling in stating she forgot to bring up her main concern during her appt with Chantale on the 17th. Pt states she has been having soreness in her armpit area, towards her ribcage. Pt states she thinks every so often it does seem swollen, but will go down. Pt also states the pain sometimes moves around but stay within the same general area of her armpit/upper ribcage. Pt requesting callback from [facility] to discuss.

## 2022-01-19 NOTE — TELEPHONE ENCOUNTER
It sounds musculoskeletal but she should get a mammogram and we can do a chest x-ray in the office.   69 y.o male pmhx DM, HTN, BPH w/ L sided intermittent chest pain. Cardiac enzymes, cardiac 69M h/o DM,HTN, BPH p/w left sided chest pain.  Pain began last night, was severe, sternal radiating to the left shoulder.  Pain is sharp, constant, unable to sleep 2/2 pain.  Unknown if it is exertional as he did not walk much 2/2 pain.  Denies SOB, abd pain, n/v/d/diaphoresis.  Denies cough or fever.  Pt given ASA 162mg by EMSAttending/Stella: 70 yo M as described above, p/w chest pain. Pt reports around midnight last night of a sudden onset of left-sided chest pain described as pressure-like, initially 8/10, non-radiating, associated w/ SALEEM. Maninder n/v, diaphoresis, or palp. Pt also notes increase of chest pain w/ exertion.    CDU YVONNE Ballard: HPI reviewed above. 69 y.o male pmhx DM, HTN, BPH w/ L sided intermittent aching chest pain. Pt states no change when at rest or with exertion. Pain travels from his L shoulder down to his L sided of chest. States the pain made it hard for him to sleep last night. No cardiac hx or family hx. Denies headache, blurry vision, cough, nausea, vomiting, abdominal, pain, numbness, weakness, tingling. Seen in the ED- first cardiac enzyme negative. Sent to observation for 2nd set Von, cardiac monitor and stress test.

## 2022-01-20 RX ORDER — MECLIZINE HCL 12.5 MG/1
TABLET ORAL
Qty: 30 TABLET | Refills: 2 | Status: SHIPPED | OUTPATIENT
Start: 2022-01-20

## 2022-01-20 NOTE — TELEPHONE ENCOUNTER
"Spoke with pt and she said she is now having dizzy spells and wants to know if that could be from the UTI.  I asked if she was having any ear issues and she said her ears have been \"Leaking\".  SG  "

## 2022-01-28 ENCOUNTER — OFFICE VISIT (OUTPATIENT)
Dept: FAMILY MEDICINE CLINIC | Facility: CLINIC | Age: 57
End: 2022-01-28

## 2022-01-28 VITALS
HEART RATE: 78 BPM | RESPIRATION RATE: 16 BRPM | OXYGEN SATURATION: 100 % | SYSTOLIC BLOOD PRESSURE: 124 MMHG | HEIGHT: 66 IN | DIASTOLIC BLOOD PRESSURE: 74 MMHG | BODY MASS INDEX: 20.51 KG/M2 | WEIGHT: 127.6 LBS | TEMPERATURE: 97.5 F

## 2022-01-28 DIAGNOSIS — S61.451A CAT BITE OF MULTIPLE SITES OF RIGHT HAND AND FINGERS WITH INFECTION, INITIAL ENCOUNTER: Primary | ICD-10-CM

## 2022-01-28 DIAGNOSIS — L08.9 CAT BITE OF MULTIPLE SITES OF RIGHT HAND AND FINGERS WITH INFECTION, INITIAL ENCOUNTER: Primary | ICD-10-CM
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
## 2022-01-28 DIAGNOSIS — S61.259A CAT BITE OF MULTIPLE SITES OF RIGHT HAND AND FINGERS WITH INFECTION, INITIAL ENCOUNTER: Primary | ICD-10-CM

## 2022-01-28 PROCEDURE — 99213 OFFICE O/P EST LOW 20 MIN: CPT | Performed by: NURSE PRACTITIONER

## 2022-01-28 RX ORDER — SULFAMETHOXAZOLE AND TRIMETHOPRIM 800; 160 MG/1; MG/1
1 TABLET ORAL 2 TIMES DAILY
Qty: 20 TABLET | Refills: 0 | Status: SHIPPED | OUTPATIENT
Start: 2022-01-28 | End: 2022-02-11

## 2022-01-28 NOTE — PROGRESS NOTES
"Chief Complaint  Animal Bite (not up to date for rabies )    Subjective          Celine Musa presents to [facility]      History of Present Illness     Celine is a 57-year-old female patient of Chantale Julien who presents today with after an animal bite.  She reports that on Wednesday her 2 male cats were fighting and she attempted to break them up.  Upon doing so, she was bitten numerous times in the right hand and scratched as well.  She did not go for medical treatment and decided to treat at home.  Patient cleaned the area and applied a \"superglue substance \"to close the wounds.    Patient exam reveals multiple abrasions on right hand and right forearm.  Between the ring finger and middle finger on her right hand is a laceration on the thenar webspace.  This area could have used some cleaning and suturing.  I did advise patient however, I did inform her that it was beyond the point of being able to close.  Erythema is surrounding knuckles and half of posterior right hand radiating 2 inches at the wrist.  Patient does report pain as a 6 out of 10 and reports throbbing when her hand is dependent to her side or not elevated.    I did advise patient to do Epson salt soaks 3-4 times daily and to keep the area clean and dry.  I prescribed her Bactrim today for treatment of infection and I do want patient to return on Monday for follow-up of wound.  She is not up-to-date on rabies however, these are her home cats but they are not up-to-date on their vaccinations.  Patient declined need for vaccine.  She did have a tetanus within the last 2 years.    Objective     Vital Signs:   /74 (BP Location: Left arm, Patient Position: Sitting, Cuff Size: Adult)   Pulse 78   Temp 97.5 °F (36.4 °C) (Infrared)   Resp 16   Ht 167.6 cm (66\")   Wt 57.9 kg (127 lb 9.6 oz)   SpO2 100%   BMI 20.60 kg/m²           Physical Exam  Vitals reviewed.   Constitutional:       " Appearance: She is well-developed.      Comments: Wearing a face mask     HENT:      Head: Normocephalic and atraumatic.   Eyes:      Conjunctiva/sclera: Conjunctivae normal.   Cardiovascular:      Rate and Rhythm: Normal rate.   Pulmonary:      Effort: Pulmonary effort is normal.   Musculoskeletal:         General: Normal range of motion.      Cervical back: Normal range of motion.   Skin:     General: Skin is warm and dry.      Findings: Abrasion, erythema, signs of injury, laceration and wound present. No rash.             Comments: Hand assessment noted above in HPI.   Neurological:      Mental Status: She is alert and oriented to person, place, and time.   Psychiatric:         Behavior: Behavior normal.                Result Review :                                   Assessment and Plan      Diagnoses and all orders for this visit:    1. Cat bite of multiple sites of right hand and fingers with infection, initial encounter (Primary)  Assessment & Plan:  Bactrim twice daily for 10 days, Epson salt soaks 3-4 times daily, keep area clean and dry, follow-up Monday for wound assessment      Other orders  -     sulfamethoxazole-trimethoprim (Bactrim DS) 800-160 MG per tablet; Take 1 tablet by mouth 2 (Two) Times a Day.  Dispense: 20 tablet; Refill: 0          Follow Up       No follow-ups on file.      Patient was given instructions and counseling regarding her condition or for health maintenance advice. Please see specific information pulled into the AVS if appropriate.     Kate Bower, APRN1/28/202213:38 EST  This note has been electronically signed

## 2022-01-28 NOTE — ASSESSMENT & PLAN NOTE
Bactrim twice daily for 10 days, Epson salt soaks 3-4 times daily, keep area clean and dry, follow-up Monday for wound assessment

## 2022-01-31 ENCOUNTER — OFFICE VISIT (OUTPATIENT)
Dept: FAMILY MEDICINE CLINIC | Facility: CLINIC | Age: 57
End: 2022-01-31

## 2022-01-31 VITALS
TEMPERATURE: 97.2 F | HEIGHT: 66 IN | WEIGHT: 128 LBS | BODY MASS INDEX: 20.57 KG/M2 | HEART RATE: 72 BPM | OXYGEN SATURATION: 97 % | DIASTOLIC BLOOD PRESSURE: 73 MMHG | SYSTOLIC BLOOD PRESSURE: 127 MMHG

## 2022-01-31 DIAGNOSIS — L08.9 CAT BITE OF MULTIPLE SITES OF RIGHT HAND AND FINGERS WITH INFECTION, SUBSEQUENT ENCOUNTER: ICD-10-CM

## 2022-01-31 DIAGNOSIS — S61.259D CAT BITE OF MULTIPLE SITES OF RIGHT HAND AND FINGERS WITH INFECTION, SUBSEQUENT ENCOUNTER: ICD-10-CM

## 2022-01-31 DIAGNOSIS — K21.9 GASTROESOPHAGEAL REFLUX DISEASE WITHOUT ESOPHAGITIS: ICD-10-CM

## 2022-01-31 DIAGNOSIS — R11.0 NAUSEA: ICD-10-CM

## 2022-01-31 DIAGNOSIS — E78.2 MIXED HYPERLIPIDEMIA: Primary | ICD-10-CM
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
## 2022-01-31 DIAGNOSIS — S61.451D CAT BITE OF MULTIPLE SITES OF RIGHT HAND AND FINGERS WITH INFECTION, SUBSEQUENT ENCOUNTER: ICD-10-CM

## 2022-01-31 PROCEDURE — 99214 OFFICE O/P EST MOD 30 MIN: CPT | Performed by: NURSE PRACTITIONER

## 2022-01-31 RX ORDER — ONDANSETRON 4 MG/1
4 TABLET, FILM COATED ORAL EVERY 8 HOURS PRN
Qty: 30 TABLET | Refills: 2 | Status: SHIPPED | OUTPATIENT
Start: 2022-01-31

## 2022-01-31 RX ORDER — ALBUTEROL SULFATE 90 UG/1
2 AEROSOL, METERED RESPIRATORY (INHALATION) EVERY 4 HOURS PRN
Qty: 18.2 G | Refills: 6 | Status: SHIPPED | OUTPATIENT
Start: 2022-01-31

## 2022-01-31 RX ORDER — OMEPRAZOLE 20 MG/1
20 CAPSULE, DELAYED RELEASE ORAL DAILY
Qty: 30 CAPSULE | Refills: 2 | Status: SHIPPED | OUTPATIENT
Start: 2022-01-31

## 2022-01-31 NOTE — PATIENT INSTRUCTIONS
Prilosec 20 mg as needed for nausea  Zofran 4 mg every 6 hours as needed nausea  Lipid panel fasting  Follow-up 6 months

## 2022-01-31 NOTE — PROGRESS NOTES
"    Celine Musa is a 57 y.o. female.     57-year-old white female with history of chronic pain, asthma, anxiety who comes in today for a wound check on right hand and complaints of nausea    Blood pressure 126/72 heart rate 72 she denies any chest pain, dyspnea, tachycardia or dizziness    Patient had a cat bite on her right hand and was placed on Bactrim and appears to be healing well swelling and redness is down    Patient's been having a lot of trouble with reflux and nausea.  I am placing her on Prilosec and Zofran.  She had a colonoscopy EGD about 5 years ago at [facility] gastroenterology but it is not in the chart.  We will see how the medications work    Weight is unchanged at 128 with a BMI of 20.7.  She has not had Covid vaccines.  She is up-to-date on eye exam and does her mammograms at [facility] and is up-to-date but they are not in the chart.            Prilosec 20 mg as needed for nausea  Zofran 4 mg every 6 hours as needed nausea  Lipid panel fasting  Follow-up 6 months       The following portions of the patient's history were reviewed and updated as appropriate: allergies, current medications, past family history, past medical history, past social history, past surgical history and problem list.    Vitals:    01/31/22 0846   BP: 127/73   BP Location: Right arm   Patient Position: Sitting   Cuff Size: Adult   Pulse: 72   Temp: 97.2 °F (36.2 °C)   TempSrc: Temporal   SpO2: 97%   Weight: 58.1 kg (128 lb)   Height: 167.6 cm (66\")     Body mass index is 20.66 kg/m².    Past Medical History:   Diagnosis Date   • Depression    • GERD (gastroesophageal reflux disease)    • Hyperlipidemia    • Multiple sclerosis (HCC)      Past Surgical History:   Procedure Laterality Date   • EYE SURGERY     • KNEE ARTHROSCOPY Left      Family History   Problem Relation Age of Onset   • Hyperlipidemia Mother    • Hyperlipidemia Father    • Heart disease Father    • Leukemia Father    • No Known " Problems Son    • No Known Problems Half-Sister    • No Known Problems Half-Sister    • No Known Problems Son      Immunization History   Administered Date(s) Administered   • Flu Vaccine Quad PF 6-35MO 12/14/2017   • H1N1 Inj 11/14/2009   • Tdap 06/24/2018       Office Visit on 01/17/2022   Component Date Value Ref Range Status   • Color 01/17/2022 Yellow  Yellow, Straw, Dark Yellow, Sadaf Final   • Clarity, UA 01/17/2022 Cloudy* Clear Final   • Specific Gravity  01/17/2022 1.025  1.005 - 1.030 Final   • pH, Urine 01/17/2022 6.0  5.0 - 8.0 Final   • Leukocytes 01/17/2022 Small (1+)* Negative Final   • Nitrite, UA 01/17/2022 Negative  Negative Final   • Protein, POC 01/17/2022 Negative  Negative mg/dL Final   • Glucose, UA 01/17/2022 Negative  Negative, 1000 mg/dL (3+) mg/dL Final   • Ketones, UA 01/17/2022 1+* Negative Final   • Urobilinogen, UA 01/17/2022 Normal  Normal Final   • Bilirubin 01/17/2022 Negative  Negative Final   • Blood, UA 01/17/2022 Negative  Negative Final   • Lot Number 01/17/2022 [id number]   Final   • Expiration Date 01/17/2022 7/25/23   Final   • Urine Culture 01/17/2022 Final report   Final   • Result 1 01/17/2022 Comment   Final    Comment: Mixed urogenital joel  Less than 10,000 colonies/mL           Review of Systems   Constitutional: Negative.    HENT: Negative.    Respiratory: Negative.    Cardiovascular: Negative.    Gastrointestinal: Positive for nausea.   Genitourinary: Negative.    Musculoskeletal:        Right hand cat bite   Neurological: Negative.    Psychiatric/Behavioral: Negative.        Objective   Physical Exam  Constitutional:       Appearance: Normal appearance.   HENT:      Head: Normocephalic.   Cardiovascular:      Rate and Rhythm: Normal rate and regular rhythm.      Pulses: Normal pulses.      Heart sounds: Normal heart sounds.   Pulmonary:      Effort: Pulmonary effort is normal.      Breath sounds: Normal breath sounds.   Abdominal:      General: Bowel sounds  are normal.   Musculoskeletal:         General: Normal range of motion.   Skin:     Comments: Right hand less swollen and no longer red.  Patient states feels much better   Neurological:      General: No focal deficit present.      Mental Status: She is alert and oriented to person, place, and time.   Psychiatric:         Mood and Affect: Mood normal.         Behavior: Behavior normal.         Procedures    Assessment/Plan   Diagnoses and all orders for this visit:    1. Mixed hyperlipidemia (Primary)  -     Lipid Panel With LDL / HDL Ratio    2. Cat bite of multiple sites of right hand and fingers with infection, subsequent encounter    3. Gastroesophageal reflux disease without esophagitis    4. Nausea    Other orders  -     omeprazole (PrilOSEC) 20 MG capsule; Take 1 capsule by mouth Daily.  Dispense: 30 capsule; Refill: 2  -     ondansetron (Zofran) 4 MG tablet; Take 1 tablet by mouth Every 8 (Eight) Hours As Needed for Nausea or Vomiting.  Dispense: 30 tablet; Refill: 2  -     albuterol sulfate HFA (Ventolin HFA) 108 (90 Base) MCG/ACT inhaler; Inhale 2 puffs Every 4 (Four) Hours As Needed for Wheezing.  Dispense: 18.2 g; Refill: 6          Current Outpatient Medications:   •  Alphagan P 0.1 % solution ophthalmic solution, Administer 1 drop to both eyes 2 (two) times a day., Disp: , Rfl:   •  buPROPion XL (WELLBUTRIN XL) 300 MG 24 hr tablet, Take 1 tablet by mouth Daily., Disp: 90 tablet, Rfl: 1  •  ergocalciferol (ERGOCALCIFEROL) 1.25 MG (76387 UT) capsule, Take 1 capsule by mouth Every 7 (Seven) Days., Disp: , Rfl:   •  meclizine (ANTIVERT) 12.5 MG tablet, 1-2 tabs q8h prn, Disp: 30 tablet, Rfl: 2  •  pravastatin (PRAVACHOL) 10 MG tablet, Take 1 tablet by mouth Daily., Disp: 30 tablet, Rfl: 2  •  sulfamethoxazole-trimethoprim (Bactrim DS) 800-160 MG per tablet, Take 1 tablet by mouth 2 (Two) Times a Day., Disp: 20 tablet, Rfl: 0  •  Teriflunomide (Aubagio) 14 MG tablet, Take 1 tablet by mouth Daily., Disp: ,  Rfl:   •  albuterol sulfate HFA (Ventolin HFA) 108 (90 Base) MCG/ACT inhaler, Inhale 2 puffs Every 4 (Four) Hours As Needed for Wheezing., Disp: 18.2 g, Rfl: 6  •  omeprazole (PrilOSEC) 20 MG capsule, Take 1 capsule by mouth Daily., Disp: 30 capsule, Rfl: 2  •  ondansetron (Zofran) 4 MG tablet, Take 1 tablet by mouth Every 8 (Eight) Hours As Needed for Nausea or Vomiting., Disp: 30 tablet, Rfl: 2           Chantale Julien, APRN1/31/202209:23 EST  This note has been electronically signed

## 2022-02-11 ENCOUNTER — TELEPHONE (OUTPATIENT)
Dept: FAMILY MEDICINE CLINIC | Facility: CLINIC | Age: 57
End: 2022-02-11

## 2022-02-11 ENCOUNTER — OFFICE VISIT (OUTPATIENT)
Dept: FAMILY MEDICINE CLINIC | Facility: CLINIC | Age: 57
End: 2022-02-11

## 2022-02-11 VITALS
OXYGEN SATURATION: 99 % | TEMPERATURE: 97.7 F | SYSTOLIC BLOOD PRESSURE: 116 MMHG | WEIGHT: 128.4 LBS | HEART RATE: 76 BPM | DIASTOLIC BLOOD PRESSURE: 68 MMHG | RESPIRATION RATE: 16 BRPM | BODY MASS INDEX: 20.63 KG/M2 | HEIGHT: 66 IN

## 2022-02-11 DIAGNOSIS — S61.451S: Primary | ICD-10-CM
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
## 2022-02-11 DIAGNOSIS — S61.259S: Primary | ICD-10-CM

## 2022-02-11 DIAGNOSIS — Z51.89 ENCOUNTER FOR WOUND RE-CHECK: ICD-10-CM

## 2022-02-11 PROCEDURE — 99213 OFFICE O/P EST LOW 20 MIN: CPT | Performed by: NURSE PRACTITIONER

## 2022-02-11 NOTE — ASSESSMENT & PLAN NOTE
Hand is healing well.  Patient request x-ray and x-ray indicated that there were no abnormalities noted.

## 2022-02-11 NOTE — PROGRESS NOTES
"Chief Complaint  Animal Bite (recheck )    Subjective          Celine Musa presents to [facility]      History of Present Illness    Celine is a 57-year-old female patient who presents today for a follow-up wound on her right hand that occurred from a cat bite.  Incident occurred on January 26.  She had a follow-up appointment on 1/31 for wound check and was healing well per Chantale Julien's note.     Patient presents today with continued pain in her right posterior hand.  Areas appear to be healing well and there are no open areas, erythema, or drainage noted.  The webbing between her ring finger and middle finger does have a scab present.  Area is slightly tender to touch.  Appears to be in the remodeling stage of healing.  Area is with light pink epithelial tissue surrounding.  Patient is requesting an x-ray of her right hand to make sure \"there are no fractures or internal infection \".  I informed patient that we will do the x-ray to ease her mind.  I did reiterate that she is afebrile in the wound have healed well. X-ray indicated no abnormalities noted.       Objective     Vital Signs:   /68 (BP Location: Left arm, Patient Position: Sitting, Cuff Size: Adult)   Pulse 76   Temp 97.7 °F (36.5 °C) (Infrared)   Resp 16   Ht 167.6 cm (66\")   Wt 58.2 kg (128 lb 6.4 oz)   SpO2 99%   BMI 20.72 kg/m²           Physical Exam  Vitals reviewed.   Constitutional:       Appearance: She is well-developed.      Comments: Wearing a face mask     HENT:      Head: Normocephalic and atraumatic.   Eyes:      Conjunctiva/sclera: Conjunctivae normal.   Cardiovascular:      Rate and Rhythm: Normal rate.   Pulmonary:      Effort: Pulmonary effort is normal.   Musculoskeletal:         General: Normal range of motion.      Cervical back: Normal range of motion.   Skin:     General: Skin is warm and dry.      Findings: No bruising, erythema or rash.             Comments: " Hand assessment noted above in HPI.  Areas of abrasion have healed well with light pink epithelial tissue. Wedding between fingers with scab. No drainage or erythema noted.    Neurological:      Mental Status: She is alert and oriented to person, place, and time.   Psychiatric:         Behavior: Behavior normal.                Result Review :                                   Assessment and Plan      Diagnoses and all orders for this visit:    1. Cat bite of multiple sites of right hand and fingers, sequela (Primary)  -     XR Hand 3+ View Right (In Office)    2. Encounter for wound re-check  Assessment & Plan:  Hand is healing well.  Patient request x-ray and x-ray indicated that there were no abnormalities noted.            Follow Up       No follow-ups on file.      Patient was given instructions and counseling regarding her condition or for health maintenance advice. Please see specific information pulled into the AVS if appropriate.     Kate Bower, APRN2/11/202209:58 EST  This note has been electronically signed

## 2022-04-12 ENCOUNTER — OFFICE VISIT (OUTPATIENT)
Dept: FAMILY MEDICINE CLINIC | Facility: CLINIC | Age: 57
End: 2022-04-12

## 2022-04-12 VITALS
BODY MASS INDEX: 21.47 KG/M2 | DIASTOLIC BLOOD PRESSURE: 70 MMHG | HEIGHT: 66 IN | SYSTOLIC BLOOD PRESSURE: 115 MMHG | WEIGHT: 133.6 LBS | HEART RATE: 61 BPM | TEMPERATURE: 97.1 F | OXYGEN SATURATION: 98 %
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
## 2022-04-12 PROCEDURE — 99213 OFFICE O/P EST LOW 20 MIN: CPT | Performed by: NURSE PRACTITIONER

## 2022-04-12 RX ORDER — SULFAMETHOXAZOLE AND TRIMETHOPRIM 800; 160 MG/1; MG/1
1 TABLET ORAL 2 TIMES DAILY
Qty: 14 TABLET | Refills: 0 | Status: SHIPPED | OUTPATIENT
Start: 2022-04-12 | End: 2022-04-19

## 2022-04-12 RX ORDER — FLUCONAZOLE 150 MG/1
150 TABLET ORAL ONCE
Qty: 1 TABLET | Refills: 0 | Status: SHIPPED | OUTPATIENT
Start: 2022-04-12 | End: 2022-04-12

## 2022-04-12 NOTE — PROGRESS NOTES
"    Celine Musa is a 57 y.o. female.     57-year-old white female with history of chronic pain, asthma, anxiety who comes in today after being bitten by a cat on April 1 in her left hand.  She went to the  urgent care was placed on Augmentin.  She states the hand is less red but is still quite swollen and very painful with pain going up the arm.  It is possible bite may have affected the tendon however I will get an x-ray today and if necessary will order a CT or MRI.  I am placing her on Bactrim and told her if there is no significant improvement in 1 week to let me know    Blood pressure 114/76 heart rate 60 she denies any chest pain, dyspnea, tachycardia or dizziness            Bactrim DS  Left hand x-ray  Call office if no significant improvement in 7 to 10 days  Ice and keep elevated as much as possible       The following portions of the patient's history were reviewed and updated as appropriate: allergies, current medications, past family history, past medical history, past social history, past surgical history and problem list.    Vitals:    04/12/22 1026   BP: 115/70   BP Location: Right arm   Patient Position: Sitting   Cuff Size: Adult   Pulse: 61   Temp: 97.1 °F (36.2 °C)   TempSrc: Temporal   SpO2: 98%   Weight: 60.6 kg (133 lb 9.6 oz)   Height: 167.6 cm (66\")     Body mass index is 21.56 kg/m².    Past Medical History:   Diagnosis Date   • Depression    • GERD (gastroesophageal reflux disease)    • Hyperlipidemia    • Multiple sclerosis (HCC)      Past Surgical History:   Procedure Laterality Date   • EYE SURGERY     • KNEE ARTHROSCOPY Left      Family History   Problem Relation Age of Onset   • Hyperlipidemia Mother    • Hyperlipidemia Father    • Heart disease Father    • Leukemia Father    • No Known Problems Son    • No Known Problems Half-Sister    • No Known Problems Half-Sister    • No Known Problems Son      Immunization History   Administered Date(s) Administered   • Flu Vaccine " Quad PF 6-35MO 12/14/2017   • H1N1 Inj 11/14/2009   • Tdap 06/24/2018       Office Visit on 01/17/2022   Component Date Value Ref Range Status   • Color 01/17/2022 Yellow  Yellow, Straw, Dark Yellow, Sadaf Final   • Clarity, UA 01/17/2022 Cloudy (A) Clear Final   • Specific Gravity  01/17/2022 1.025  1.005 - 1.030 Final   • pH, Urine 01/17/2022 6.0  5.0 - 8.0 Final   • Leukocytes 01/17/2022 Small (1+) (A) Negative Final   • Nitrite, UA 01/17/2022 Negative  Negative Final   • Protein, POC 01/17/2022 Negative  Negative mg/dL Final   • Glucose, UA 01/17/2022 Negative  Negative, 1000 mg/dL (3+) mg/dL Final   • Ketones, UA 01/17/2022 1+ (A) Negative Final   • Urobilinogen, UA 01/17/2022 Normal  Normal Final   • Bilirubin 01/17/2022 Negative  Negative Final   • Blood, UA 01/17/2022 Negative  Negative Final   • Lot Number 01/17/2022 [id number]   Final   • Expiration Date 01/17/2022 7/25/23   Final   • Urine Culture 01/17/2022 Final report   Final   • Result 1 01/17/2022 Comment   Final    Comment: Mixed urogenital joel  Less than 10,000 colonies/mL           Review of Systems   Constitutional: Negative.    HENT: Negative.    Respiratory: Negative.    Cardiovascular: Negative.    Gastrointestinal: Negative.    Genitourinary: Negative.    Musculoskeletal:        Left hand pain   Neurological: Negative.    Psychiatric/Behavioral: Negative.        Objective   Physical Exam  Constitutional:       Appearance: Normal appearance.   HENT:      Head: Normocephalic.   Cardiovascular:      Rate and Rhythm: Normal rate and regular rhythm.      Pulses: Normal pulses.      Heart sounds: Normal heart sounds.   Pulmonary:      Effort: Pulmonary effort is normal.      Breath sounds: Normal breath sounds.   Abdominal:      General: Bowel sounds are normal.   Musculoskeletal:      Comments: Left hand swollen on top of hand and pain shooting from middle of top of hand up to elbow.  She is able to make a fist with effort   Skin:      General: Skin is warm and dry.   Neurological:      General: No focal deficit present.      Mental Status: She is alert and oriented to person, place, and time.   Psychiatric:         Mood and Affect: Mood normal.         Behavior: Behavior normal.         Procedures    Assessment/Plan   Diagnoses and all orders for this visit:    1. Cat bite, initial encounter (Primary)  -     XR Hand 3+ View Left (In Office)    Other orders  -     sulfamethoxazole-trimethoprim (BACTRIM DS,SEPTRA DS) 800-160 MG per tablet; Take 1 tablet by mouth 2 (Two) Times a Day for 7 days.  Dispense: 14 tablet; Refill: 0  -     fluconazole (Diflucan) 150 MG tablet; Take 1 tablet by mouth 1 (One) Time for 1 dose.  Dispense: 1 tablet; Refill: 0          Current Outpatient Medications:   •  albuterol sulfate HFA (Ventolin HFA) 108 (90 Base) MCG/ACT inhaler, Inhale 2 puffs Every 4 (Four) Hours As Needed for Wheezing., Disp: 18.2 g, Rfl: 6  •  Alphagan P 0.1 % solution ophthalmic solution, Administer 1 drop to both eyes 2 (two) times a day., Disp: , Rfl:   •  buPROPion XL (WELLBUTRIN XL) 300 MG 24 hr tablet, Take 1 tablet by mouth Daily., Disp: 90 tablet, Rfl: 1  •  meclizine (ANTIVERT) 12.5 MG tablet, 1-2 tabs q8h prn, Disp: 30 tablet, Rfl: 2  •  omeprazole (PrilOSEC) 20 MG capsule, Take 1 capsule by mouth Daily., Disp: 30 capsule, Rfl: 2  •  ondansetron (Zofran) 4 MG tablet, Take 1 tablet by mouth Every 8 (Eight) Hours As Needed for Nausea or Vomiting., Disp: 30 tablet, Rfl: 2  •  pravastatin (PRAVACHOL) 10 MG tablet, Take 1 tablet by mouth Daily., Disp: 30 tablet, Rfl: 2  •  Teriflunomide (Aubagio) 14 MG tablet, Take 1 tablet by mouth Daily., Disp: , Rfl:   •  fluconazole (Diflucan) 150 MG tablet, Take 1 tablet by mouth 1 (One) Time for 1 dose., Disp: 1 tablet, Rfl: 0  •  sulfamethoxazole-trimethoprim (BACTRIM DS,SEPTRA DS) 800-160 MG per tablet, Take 1 tablet by mouth 2 (Two) Times a Day for 7 days., Disp: 14 tablet, Rfl: 0           Chantale  ISELA Julien 4/12/2022 11:23 EDT  This note has been electronically signed

## 2022-04-12 NOTE — PATIENT INSTRUCTIONS
Bactrim DS  Left hand x-ray  Call office if no significant improvement in 7 to 10 days  Ice and keep elevated as much as possible

## 2022-04-22 ENCOUNTER — OFFICE VISIT (OUTPATIENT)
Dept: FAMILY MEDICINE CLINIC | Facility: CLINIC | Age: 57
End: 2022-04-22

## 2022-04-22 VITALS
HEART RATE: 68 BPM | SYSTOLIC BLOOD PRESSURE: 122 MMHG | WEIGHT: 130 LBS | BODY MASS INDEX: 20.89 KG/M2 | OXYGEN SATURATION: 97 % | HEIGHT: 66 IN | TEMPERATURE: 97.6 F | DIASTOLIC BLOOD PRESSURE: 64 MMHG
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
## 2022-04-22 PROCEDURE — 99213 OFFICE O/P EST LOW 20 MIN: CPT | Performed by: NURSE PRACTITIONER

## 2022-04-22 RX ORDER — SULFAMETHOXAZOLE AND TRIMETHOPRIM 800; 160 MG/1; MG/1
1 TABLET ORAL 2 TIMES DAILY
Qty: 6 TABLET | Refills: 0 | Status: SHIPPED | OUTPATIENT
Start: 2022-04-22 | End: 2022-05-16

## 2022-04-22 NOTE — PROGRESS NOTES
"Chief Complaint  Animal Bite    Subjective          Celine Musa presents to [facility]      History of Present Illness    Celine is a 57-year-old female who presents today for animal bite follow-up.     She was bit by cat on her left hand April 1 at her home. She went to immediate care the next day and was given bacitracin and Augmentin. She followed up with Chantale Julien on April 12.  Chantale switched her to Bactrim twice daily for 7 days.  Patient returns today as she is still with some discomfort and redness of left hand. Left hand with very mild erythema.  External bite wounds are closed and healed.  I will proceed with 3 more days of Bactrim and this should complete the course of healing.      Objective     Vital Signs:   /64 (BP Location: Left arm, Patient Position: Sitting, Cuff Size: Adult)   Pulse 68   Temp 97.6 °F (36.4 °C) (Oral)   Ht 167.6 cm (65.98\")   Wt 59 kg (130 lb)   SpO2 97%   BMI 20.99 kg/m²           Physical Exam  Vitals reviewed.   Constitutional:       Appearance: She is well-developed.      Comments: Wearing a face mask     HENT:      Head: Normocephalic and atraumatic.   Eyes:      Conjunctiva/sclera: Conjunctivae normal.   Cardiovascular:      Rate and Rhythm: Normal rate.   Pulmonary:      Effort: Pulmonary effort is normal. No respiratory distress.   Musculoskeletal:         General: Normal range of motion.      Cervical back: Normal range of motion.   Skin:     General: Skin is warm and dry.      Findings: Erythema present. No rash.      Comments: Blanchable erythema left posterior hand, mild. Tenderness to palpation. Area is CDI. No drainage or open areas noted.    Neurological:      Mental Status: She is alert and oriented to person, place, and time.   Psychiatric:         Behavior: Behavior normal.                Result Review :                                   Assessment and Plan      Diagnoses and all orders for this " visit:    1. Cat bite, subsequent encounter (Primary)  Assessment & Plan:  Treating patient additional 3 days of Bactrim twice daily.  Advised to return if any issues.      Other orders  -     sulfamethoxazole-trimethoprim (Bactrim DS) 800-160 MG per tablet; Take 1 tablet by mouth 2 (Two) Times a Day.  Dispense: 6 tablet; Refill: 0          Follow Up       No follow-ups on file.      Patient was given instructions and counseling regarding her condition or for health maintenance advice. Please see specific information pulled into the AVS if appropriate.     Kate Bower, APRN4/22/[id number] EDT  This note has been electronically signed

## 2022-04-29 ENCOUNTER — OFFICE VISIT (OUTPATIENT)
Dept: FAMILY MEDICINE CLINIC | Facility: CLINIC | Age: 57
End: 2022-04-29

## 2022-04-29 VITALS
BODY MASS INDEX: 21.28 KG/M2 | WEIGHT: 132.4 LBS | DIASTOLIC BLOOD PRESSURE: 80 MMHG | OXYGEN SATURATION: 97 % | TEMPERATURE: 97.7 F | SYSTOLIC BLOOD PRESSURE: 122 MMHG | HEART RATE: 85 BPM | HEIGHT: 66 IN
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
## 2022-04-29 DIAGNOSIS — M79.642 LEFT HAND PAIN: Primary | ICD-10-CM

## 2022-04-29 PROCEDURE — 99213 OFFICE O/P EST LOW 20 MIN: CPT | Performed by: NURSE PRACTITIONER

## 2022-04-29 RX ORDER — IBUPROFEN 800 MG/1
800 TABLET ORAL EVERY 8 HOURS PRN
Qty: 15 TABLET | Refills: 0 | Status: SHIPPED | OUTPATIENT
Start: 2022-04-29 | End: 2022-05-16 | Stop reason: SDUPTHER

## 2022-04-29 NOTE — ASSESSMENT & PLAN NOTE
Area is healed with out any indication of infection.  Sending in prescription ibuprofen for patient to take every 8 hours as needed for discomfort.  Advised she should return if symptoms do not improve or become worse.

## 2022-04-29 NOTE — PROGRESS NOTES
"Chief Complaint  Animal Bite    Subjective          Celine Musa presents to [facility]      History of Present Illness    Celine is a 57-year-old female patient who presents today to follow-up for cat bite that occurred April 1.      She followed up with Chantale Julien on April 12 and again with myself on April 22.  On her last visit with me, I had extended Bactrim for 3 additional days due to remaining symptoms.  She presents today complaining of continuation of mild left hand pain.  Area is without erythema, former wounds are healed.  There is some tenderness to palpation but no inflammation present. ROM WNL. VSS afebrile.     Objective     Vital Signs:   /80 (BP Location: Right arm, Patient Position: Sitting, Cuff Size: Adult)   Pulse 85   Temp 97.7 °F (36.5 °C) (Infrared)   Ht 167.6 cm (65.98\")   Wt 60.1 kg (132 lb 6.4 oz)   SpO2 97%   BMI 21.38 kg/m²           Physical Exam  Constitutional:       Appearance: She is well-developed.      Comments: Wearing a face mask     HENT:      Head: Normocephalic and atraumatic.   Eyes:      Conjunctiva/sclera: Conjunctivae normal.   Cardiovascular:      Rate and Rhythm: Normal rate.   Pulmonary:      Effort: Pulmonary effort is normal.   Musculoskeletal:         General: Normal range of motion.      Cervical back: Normal range of motion.   Skin:     General: Skin is warm and dry.      Findings: No rash.      Comments: Healed scars present. No erythema noted. No open areas. Tender to palpation.    Neurological:      Mental Status: She is alert and oriented to person, place, and time.   Psychiatric:         Behavior: Behavior normal.                Result Review :                                   Assessment and Plan      Diagnoses and all orders for this visit:    1. Left hand pain (Primary)    2. Cat bite, subsequent encounter  Assessment & Plan:  Area is healed with out any indication of infection.  Sending in " prescription ibuprofen for patient to take every 8 hours as needed for discomfort.  Advised she should return if symptoms do not improve or become worse.      Other orders  -     ibuprofen (ADVIL,MOTRIN) 800 MG tablet; Take 1 tablet by mouth Every 8 (Eight) Hours As Needed for Mild Pain .  Dispense: 15 tablet; Refill: 0          Follow Up       No follow-ups on file.      Patient was given instructions and counseling regarding her condition or for health maintenance advice. Please see specific information pulled into the AVS if appropriate.     Kate Bower, APRN5/10/[id number]:57 EDT  This note has been electronically signed

## 2022-05-10 PROBLEM — M79.642 LEFT HAND PAIN: Status: ACTIVE | Noted: 2022-05-10

## 2022-05-16 ENCOUNTER — OFFICE VISIT (OUTPATIENT)
Dept: FAMILY MEDICINE CLINIC | Facility: CLINIC | Age: 57
End: 2022-05-16

## 2022-05-16 VITALS
DIASTOLIC BLOOD PRESSURE: 75 MMHG | OXYGEN SATURATION: 98 % | SYSTOLIC BLOOD PRESSURE: 119 MMHG | WEIGHT: 132.8 LBS | BODY MASS INDEX: 21.34 KG/M2 | HEIGHT: 66 IN | TEMPERATURE: 97.5 F | HEART RATE: 72 BPM

## 2022-05-16 DIAGNOSIS — M54.2 NECK PAIN: ICD-10-CM

## 2022-05-16 DIAGNOSIS — M25.521 RIGHT ELBOW PAIN: ICD-10-CM
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
## 2022-05-16 PROCEDURE — 99213 OFFICE O/P EST LOW 20 MIN: CPT | Performed by: NURSE PRACTITIONER

## 2022-05-16 RX ORDER — IBUPROFEN 800 MG/1
800 TABLET ORAL EVERY 8 HOURS PRN
Qty: 15 TABLET | Refills: 0 | Status: SHIPPED | OUTPATIENT
Start: 2022-05-16

## 2022-05-16 NOTE — PROGRESS NOTES
"    Celine Musa is a 57 y.o. female.     57-year-old white female with chronic pain, asthma, anxiety and today.  She states on Friday she was at a  home and fell going down the steps.  She states she hit her right elbow hard and the back of her head but did not lose consciousness.  She is complaining of right elbow pain and right neck pain and wants to have x-rays done.    Blood pressure 118/74 heart rate 72 she denies any chest pain, dyspnea, tachycardia or dizziness                  Right elbow x-ray  Cervical x-ray  Ice to elbow  Ibuprofen  Wear elbow brace       The following portions of the patient's history were reviewed and updated as appropriate: allergies, current medications, past family history, past medical history, past social history, past surgical history and problem list.    Vitals:    22 1020   BP: 119/75   BP Location: Right arm   Patient Position: Sitting   Cuff Size: Adult   Pulse: 72   Temp: 97.5 °F (36.4 °C)   TempSrc: Temporal   SpO2: 98%   Weight: 60.2 kg (132 lb 12.8 oz)   Height: 167.6 cm (66\")     Body mass index is 21.43 kg/m².    Past Medical History:   Diagnosis Date   • Depression    • GERD (gastroesophageal reflux disease)    • Hyperlipidemia    • Multiple sclerosis (HCC)      Past Surgical History:   Procedure Laterality Date   • EYE SURGERY     • KNEE ARTHROSCOPY Left      Family History   Problem Relation Age of Onset   • Hyperlipidemia Mother    • Hyperlipidemia Father    • Heart disease Father    • Leukemia Father    • No Known Problems Son    • No Known Problems Half-Sister    • No Known Problems Half-Sister    • No Known Problems Son      Immunization History   Administered Date(s) Administered   • Flu Vaccine Quad PF 6-35MO 2017   • H1N1 Inj 2009   • Tdap 2018       Office Visit on 2022   Component Date Value Ref Range Status   • Color 2022 Yellow  Yellow, Straw, Dark Yellow, Sadaf Final   • Clarity, UA 2022 Cloudy " (A) Clear Final   • Specific Gravity  01/17/2022 1.025  1.005 - 1.030 Final   • pH, Urine 01/17/2022 6.0  5.0 - 8.0 Final   • Leukocytes 01/17/2022 Small (1+) (A) Negative Final   • Nitrite, UA 01/17/2022 Negative  Negative Final   • Protein, POC 01/17/2022 Negative  Negative mg/dL Final   • Glucose, UA 01/17/2022 Negative  Negative, 1000 mg/dL (3+) mg/dL Final   • Ketones, UA 01/17/2022 1+ (A) Negative Final   • Urobilinogen, UA 01/17/2022 Normal  Normal Final   • Bilirubin 01/17/2022 Negative  Negative Final   • Blood, UA 01/17/2022 Negative  Negative Final   • Lot Number 01/17/2022 [id number]   Final   • Expiration Date 01/17/2022 7/25/23   Final   • Urine Culture 01/17/2022 Final report   Final   • Result 1 01/17/2022 Comment   Final    Comment: Mixed urogenital joel  Less than 10,000 colonies/mL           Review of Systems   Constitutional: Negative.    HENT: Negative.    Respiratory: Negative.    Cardiovascular: Negative.    Gastrointestinal: Negative.    Genitourinary: Negative.    Musculoskeletal: Positive for neck pain.        Right elbow pain   Skin: Negative.    Neurological: Negative.    Psychiatric/Behavioral: Negative.        Objective   Physical Exam  Constitutional:       Appearance: Normal appearance.   HENT:      Head: Normocephalic.   Cardiovascular:      Rate and Rhythm: Normal rate and regular rhythm.      Pulses: Normal pulses.      Heart sounds: Normal heart sounds.   Pulmonary:      Effort: Pulmonary effort is normal.      Breath sounds: Normal breath sounds.   Abdominal:      General: Bowel sounds are normal.   Musculoskeletal:      Comments: Right elbow puffy with pain on inner aspect of elbow patient complains of pain at base of skull going down neck     Skin:     General: Skin is warm and dry.   Neurological:      General: No focal deficit present.      Mental Status: She is alert and oriented to person, place, and time.   Psychiatric:         Mood and Affect: Mood normal.          Behavior: Behavior normal.         Procedures    Assessment & Plan   Diagnoses and all orders for this visit:    1. Fall, initial encounter (Primary)  -     XR Elbow 2 View Right (In Office)  -     XR Spine Cervical 2 or 3 View    2. Neck pain    3. Right elbow pain    Other orders  -     ibuprofen (ADVIL,MOTRIN) 800 MG tablet; Take 1 tablet by mouth Every 8 (Eight) Hours As Needed for Mild Pain .  Dispense: 15 tablet; Refill: 0          Current Outpatient Medications:   •  albuterol sulfate HFA (Ventolin HFA) 108 (90 Base) MCG/ACT inhaler, Inhale 2 puffs Every 4 (Four) Hours As Needed for Wheezing., Disp: 18.2 g, Rfl: 6  •  Alphagan P 0.1 % solution ophthalmic solution, Administer 1 drop to both eyes 2 (two) times a day., Disp: , Rfl:   •  buPROPion XL (WELLBUTRIN XL) 300 MG 24 hr tablet, Take 1 tablet by mouth Daily., Disp: 90 tablet, Rfl: 1  •  ibuprofen (ADVIL,MOTRIN) 800 MG tablet, Take 1 tablet by mouth Every 8 (Eight) Hours As Needed for Mild Pain ., Disp: 15 tablet, Rfl: 0  •  meclizine (ANTIVERT) 12.5 MG tablet, 1-2 tabs q8h prn, Disp: 30 tablet, Rfl: 2  •  omeprazole (PrilOSEC) 20 MG capsule, Take 1 capsule by mouth Daily., Disp: 30 capsule, Rfl: 2  •  ondansetron (Zofran) 4 MG tablet, Take 1 tablet by mouth Every 8 (Eight) Hours As Needed for Nausea or Vomiting., Disp: 30 tablet, Rfl: 2  •  pravastatin (PRAVACHOL) 10 MG tablet, Take 1 tablet by mouth Daily., Disp: 30 tablet, Rfl: 2  •  Teriflunomide (Aubagio) 14 MG tablet, Take 1 tablet by mouth Daily., Disp: , Rfl:            Chantale Julien, ISELA 5/16/2022 11:08 EDT  This note has been electronically signed

## 2022-10-07 ENCOUNTER — TELEPHONE (OUTPATIENT)
Dept: FAMILY MEDICINE CLINIC | Facility: CLINIC | Age: 57
End: 2022-10-07

## 2022-10-07 DIAGNOSIS — Z12.11 COLON CANCER SCREENING: Primary | ICD-10-CM

## 2022-10-07 RX ORDER — FLUTICASONE PROPIONATE 50 MCG
2 SPRAY, SUSPENSION (ML) NASAL DAILY
Qty: 16 G | Refills: 5 | Status: SHIPPED | OUTPATIENT
Start: 2022-10-07

## 2022-10-07 RX ORDER — FLUTICASONE PROPIONATE 50 MCG
2 SPRAY, SUSPENSION (ML) NASAL DAILY
COMMUNITY
End: 2022-10-07 | Stop reason: SDUPTHER

## 2022-10-07 NOTE — TELEPHONE ENCOUNTER
Caller: AMEE    Relationship:     Best call back number: [phone]        What specialty or service is being requested: COLONOSCOPY    What is the provider, practice or medical service name: [facility] WOULD LIKE HER TO GO.          Any additional details: PATIENT INSURANCE COMPANY CALLING TO HELP FACILITATE GETTING ORDER\ REFERRAL FOR PATIENT TO GET A COLONOSCOPY.

## 2022-12-12 RX ORDER — IBUPROFEN 800 MG/1
TABLET ORAL
Qty: 15 TABLET | Refills: 0 | OUTPATIENT
Start: 2022-12-12

## 2023-02-03 ENCOUNTER — OFFICE VISIT (OUTPATIENT)
Dept: FAMILY MEDICINE CLINIC | Facility: CLINIC | Age: 58
End: 2023-02-03
Payer: COMMERCIAL

## 2023-02-03 VITALS
SYSTOLIC BLOOD PRESSURE: 122 MMHG | HEIGHT: 66 IN | HEART RATE: 67 BPM | TEMPERATURE: 97.8 F | DIASTOLIC BLOOD PRESSURE: 66 MMHG | BODY MASS INDEX: 21.69 KG/M2 | WEIGHT: 135 LBS | OXYGEN SATURATION: 97 %

## 2023-02-03 DIAGNOSIS — N39.0 URINARY TRACT INFECTION WITHOUT HEMATURIA, SITE UNSPECIFIED: Primary | ICD-10-CM

## 2023-02-03 LAB
BILIRUB BLD-MCNC: NEGATIVE MG/DL
CLARITY, POC: CLEAR
COLOR UR: YELLOW
EXPIRATION DATE: ABNORMAL
GLUCOSE UR STRIP-MCNC: NEGATIVE MG/DL
KETONES UR QL: NEGATIVE
LEUKOCYTE EST, POC: ABNORMAL
Lab: ABNORMAL
NITRITE UR-MCNC: NEGATIVE MG/ML
PH UR: 6 [PH] (ref 5–8)
PROT UR STRIP-MCNC: NEGATIVE MG/DL
RBC # UR STRIP: ABNORMAL /UL
SP GR UR: 1.01 (ref 1–1.03)
UROBILINOGEN UR QL: NORMAL

## 2023-02-03 PROCEDURE — 99213 OFFICE O/P EST LOW 20 MIN: CPT | Performed by: NURSE PRACTITIONER

## 2023-02-03 PROCEDURE — 81003 URINALYSIS AUTO W/O SCOPE: CPT | Performed by: NURSE PRACTITIONER

## 2023-02-03 RX ORDER — NITROFURANTOIN 25; 75 MG/1; MG/1
100 CAPSULE ORAL 2 TIMES DAILY
Qty: 10 CAPSULE | Refills: 0 | Status: SHIPPED | OUTPATIENT
Start: 2023-02-03 | End: 2023-02-10

## 2023-02-03 NOTE — PROGRESS NOTES
"Chief Complaint  Urinary Tract Infection    Subjective          Celine Musa presents to [facility]      History of Present Illness    Celine is a 58-year-old female patient of Chantale Julien who presents today with complaints of UTI symptoms.      She began with symptoms on . She does intake 2 cups of coffee daily. Painful urination, tinge of blood and bloating. UA positive for blood large, large leukocytes 3+.  We will treat with Macrobid.        Objective     Vital Signs:   /66 (BP Location: Left arm, Patient Position: Sitting, Cuff Size: Adult)   Pulse 67   Temp 97.8 °F (36.6 °C) (Oral)   Ht 167.6 cm (65.98\")   Wt 61.2 kg (135 lb)   SpO2 97%   BMI 21.80 kg/m²           Physical Exam  Vitals reviewed.   Constitutional:       Appearance: She is well-developed.      Comments: Wearing a face mask     HENT:      Head: Normocephalic and atraumatic.   Eyes:      Conjunctiva/sclera: Conjunctivae normal.      Pupils: Pupils are equal, round, and reactive to light.   Cardiovascular:      Rate and Rhythm: Normal rate and regular rhythm.      Pulses: Normal pulses.      Heart sounds: Normal heart sounds.   Pulmonary:      Effort: Pulmonary effort is normal. No respiratory distress.      Breath sounds: Normal breath sounds.   Abdominal:      General: Abdomen is flat.      Tenderness: There is abdominal tenderness in the suprapubic area. There is no right CVA tenderness or left CVA tenderness.   Musculoskeletal:         General: Normal range of motion.      Cervical back: Normal range of motion.   Skin:     General: Skin is warm and dry.      Findings: No rash.   Neurological:      Mental Status: She is alert and oriented to person, place, and time.   Psychiatric:         Behavior: Behavior normal.                Result Review :                                   Assessment and Plan      Diagnoses and all orders for this visit:    1. Urinary tract infection without " hematuria, site unspecified (Primary)  -     POC Urinalysis Dipstick, Automated  -     Urine Culture - Urine, Urine, Clean Catch    Other orders  -     nitrofurantoin, macrocrystal-monohydrate, (Macrobid) 100 MG capsule; Take 1 capsule by mouth 2 (Two) Times a Day.  Dispense: 10 capsule; Refill: 0      Patient with UTI.  Sending for culture.  Treating with Macrobid twice daily for 5 days in the meantime.          Follow Up       No follow-ups on file.      Patient was given instructions and counseling regarding her condition or for health maintenance advice. Please see specific information pulled into the AVS if appropriate.     Kate Bower, APRN2/3/[id number]:06 EST  This note has been electronically signed

## 2023-02-07 ENCOUNTER — TELEPHONE (OUTPATIENT)
Dept: FAMILY MEDICINE CLINIC | Facility: CLINIC | Age: 58
End: 2023-02-07

## 2023-02-07 LAB
BACTERIA UR CULT: ABNORMAL
BACTERIA UR CULT: ABNORMAL
OTHER ANTIBIOTIC SUSC ISLT: ABNORMAL

## 2023-02-07 NOTE — TELEPHONE ENCOUNTER
Caller: Celine Strong    Relationship: Self    Best call back number: [phone]    What is the best time to reach you: ANY    Who are you requesting to speak with (clinical staff, provider,  specific staff member): CLINICAL    What was the call regarding: WOULD LIKE TO KNOW THE RESULTS OF THE SECONDARY UA FROM 2.3.23    STILL EXPERIENCING FREQUENCY AND URGENCY    Do you require a callback: YES

## 2023-02-08 NOTE — TELEPHONE ENCOUNTER
Caller: Celine Strong    Relationship: Self    Best call back number: [phone]    Who are you requesting to speak with (clinical staff, provider,  specific staff member): PEGGY OR CLINICAL STAFF    What was the call regarding: URGENCY, PELVIC PAIN    Do you require a callback: YES    [facility] [address] [facility], [place] - [address] [phone] [place] [address] [phone] [phone]

## 2023-02-09 NOTE — TELEPHONE ENCOUNTER
HUB TO READ  I tried to call patient again, her VM box was full and could not leave a msg. She is on the appropriate antibiotic per her urine culture.

## 2023-02-10 ENCOUNTER — OFFICE VISIT (OUTPATIENT)
Dept: FAMILY MEDICINE CLINIC | Facility: CLINIC | Age: 58
End: 2023-02-10
Payer: MEDICARE

## 2023-02-10 VITALS
BODY MASS INDEX: 21.6 KG/M2 | SYSTOLIC BLOOD PRESSURE: 128 MMHG | HEIGHT: 66 IN | OXYGEN SATURATION: 98 % | TEMPERATURE: 98.4 F | DIASTOLIC BLOOD PRESSURE: 64 MMHG | HEART RATE: 86 BPM | WEIGHT: 134.4 LBS

## 2023-02-10 DIAGNOSIS — N39.0 URINARY TRACT INFECTION WITHOUT HEMATURIA, SITE UNSPECIFIED: Primary | ICD-10-CM

## 2023-02-10 DIAGNOSIS — Z13.220 SCREENING FOR CHOLESTEROL LEVEL: ICD-10-CM

## 2023-02-10 DIAGNOSIS — F33.41 RECURRENT MAJOR DEPRESSIVE DISORDER, IN PARTIAL REMISSION: ICD-10-CM

## 2023-02-10 DIAGNOSIS — N30.00 ACUTE CYSTITIS WITHOUT HEMATURIA: ICD-10-CM

## 2023-02-10 LAB
BILIRUB BLD-MCNC: NEGATIVE MG/DL
CLARITY, POC: CLEAR
COLOR UR: YELLOW
EXPIRATION DATE: ABNORMAL
GLUCOSE UR STRIP-MCNC: NEGATIVE MG/DL
KETONES UR QL: NEGATIVE
LEUKOCYTE EST, POC: ABNORMAL
Lab: ABNORMAL
NITRITE UR-MCNC: NEGATIVE MG/ML
PH UR: 6 [PH] (ref 5–8)
PROT UR STRIP-MCNC: NEGATIVE MG/DL
RBC # UR STRIP: NEGATIVE /UL
SP GR UR: 1.01 (ref 1–1.03)
UROBILINOGEN UR QL: NORMAL

## 2023-02-10 PROCEDURE — 81003 URINALYSIS AUTO W/O SCOPE: CPT | Performed by: NURSE PRACTITIONER

## 2023-02-10 PROCEDURE — 99214 OFFICE O/P EST MOD 30 MIN: CPT | Performed by: NURSE PRACTITIONER

## 2023-02-10 RX ORDER — BUPROPION HYDROCHLORIDE 300 MG/1
300 TABLET ORAL DAILY
Qty: 90 TABLET | Refills: 1 | Status: SHIPPED | OUTPATIENT
Start: 2023-02-10

## 2023-02-10 RX ORDER — SULFAMETHOXAZOLE AND TRIMETHOPRIM 800; 160 MG/1; MG/1
1 TABLET ORAL 2 TIMES DAILY
Qty: 10 TABLET | Refills: 0 | Status: SHIPPED | OUTPATIENT
Start: 2023-02-10 | End: 2023-03-07

## 2023-02-10 NOTE — PROGRESS NOTES
"Chief Complaint  Urinary Tract Infection    Subjective          Celine Musa presents to [facility] INTER[facility]ICINE      History of Present Illness    Celine is a 58-year-old female patient of Chantale Julien who presents today with continuation of UTI.    Patient was seen in office on 2/3/2023 for dysuria.  She was with large blood large 3+ leukocytes.  I had placed her on antibiotic therapy with Macrobid.  UA is improved with 2+ leukocytes and no blood however, we will send for culture.  Can place patient on a round of Bactrim since she is still with symptoms.    Patient has been out of her Wellbutrin for about 3 months.  She does report her mother  and she is asking for refill of this medication.    She is asking for refill of Wellbutrin. Depression has returnedsince passing of mother.  She has not had any antidepressant in about 3 months.  She has no SI, HI, want to self-harm.  Advised to follow-up with Chantale in 3 months for this.      Objective     Vital Signs:   /64 (BP Location: Left arm, Patient Position: Sitting, Cuff Size: Adult)   Pulse 86   Temp 98.4 °F (36.9 °C) (Oral)   Ht 167.6 cm (65.98\")   Wt 61 kg (134 lb 6.4 oz)   SpO2 98%   BMI 21.70 kg/m²           Physical Exam  Vitals reviewed.   Constitutional:       Appearance: She is well-developed.      Comments: Wearing a face mask     HENT:      Head: Normocephalic and atraumatic.      Nose: Nose normal.   Eyes:      Conjunctiva/sclera: Conjunctivae normal.      Pupils: Pupils are equal, round, and reactive to light.   Cardiovascular:      Rate and Rhythm: Normal rate and regular rhythm.      Pulses: Normal pulses.      Heart sounds: Normal heart sounds.   Pulmonary:      Effort: Pulmonary effort is normal. No respiratory distress.      Breath sounds: Normal breath sounds.   Abdominal:      General: Bowel sounds are normal.      Palpations: Abdomen is soft.      Tenderness: There is abdominal tenderness " in the suprapubic area.       Musculoskeletal:         General: Normal range of motion.      Cervical back: Normal range of motion.   Skin:     General: Skin is warm and dry.      Findings: No rash.   Neurological:      Mental Status: She is alert and oriented to person, place, and time.   Psychiatric:         Behavior: Behavior normal.                Result Review :                                   Assessment and Plan      Diagnoses and all orders for this visit:    1. Urinary tract infection without hematuria, site unspecified (Primary)  -     POC Urinalysis Dipstick, Automated  -     CBC w AUTO Differential  -     Comprehensive metabolic panel    2. Acute cystitis without hematuria  -     Urine Culture - Urine, Urine, Clean Catch  -     sulfamethoxazole-trimethoprim (Bactrim DS) 800-160 MG per tablet; Take 1 tablet by mouth 2 (Two) Times a Day.  Dispense: 10 tablet; Refill: 0    3. Screening for cholesterol level  -     Lipid Panel With LDL / HDL Ratio    4. Recurrent major depressive disorder, in partial remission (HCC)  -     buPROPion XL (WELLBUTRIN XL) 300 MG 24 hr tablet; Take 1 tablet by mouth Daily.  Dispense: 90 tablet; Refill: 1      Patient with history of depression.  It has been intermittent.  Her mother recently passed and she is requesting to go back at this antidepression.  She has not had labs in about a year so we are obtaining those today per patient request.  Going to treat UTI with Bactrim twice daily for 5 days.  We will call her Monday when culture has returned.          Follow Up       Return in about 3 months (around 5/10/2023) for with Chantale Julien NP.      Patient was given instructions and counseling regarding her condition or for health maintenance advice. Please see specific information pulled into the AVS if appropriate.     Kate Bower, [id number] EST  This note has been electronically signed

## 2023-02-11 LAB
ALBUMIN SERPL-MCNC: 4.5 G/DL (ref 3.8–4.9)
ALBUMIN/GLOB SERPL: 2 {RATIO} (ref 1.2–2.2)
ALP SERPL-CCNC: 70 IU/L (ref 44–121)
ALT SERPL-CCNC: 29 IU/L (ref 0–32)
AST SERPL-CCNC: 19 IU/L (ref 0–40)
BASOPHILS # BLD AUTO: 0.1 X10E3/UL (ref 0–0.2)
BASOPHILS NFR BLD AUTO: 1 %
BILIRUB SERPL-MCNC: 0.4 MG/DL (ref 0–1.2)
BUN SERPL-MCNC: 17 MG/DL (ref 6–24)
BUN/CREAT SERPL: 19 (ref 9–23)
CALCIUM SERPL-MCNC: 9.8 MG/DL (ref 8.7–10.2)
CHLORIDE SERPL-SCNC: 105 MMOL/L (ref 96–106)
CHOLEST SERPL-MCNC: 231 MG/DL (ref 100–199)
CO2 SERPL-SCNC: 23 MMOL/L (ref 20–29)
CREAT SERPL-MCNC: 0.9 MG/DL (ref 0.57–1)
EGFRCR SERPLBLD CKD-EPI 2021: 74 ML/MIN/1.73
EOSINOPHIL # BLD AUTO: 0.4 X10E3/UL (ref 0–0.4)
EOSINOPHIL NFR BLD AUTO: 6 %
ERYTHROCYTE [DISTWIDTH] IN BLOOD BY AUTOMATED COUNT: 13.8 % (ref 11.7–15.4)
GLOBULIN SER CALC-MCNC: 2.3 G/DL (ref 1.5–4.5)
GLUCOSE SERPL-MCNC: 96 MG/DL (ref 70–99)
HCT VFR BLD AUTO: 40.3 % (ref 34–46.6)
HDLC SERPL-MCNC: 70 MG/DL
HGB BLD-MCNC: 14 G/DL (ref 11.1–15.9)
IMM GRANULOCYTES # BLD AUTO: 0 X10E3/UL (ref 0–0.1)
IMM GRANULOCYTES NFR BLD AUTO: 0 %
LDLC SERPL CALC-MCNC: 147 MG/DL (ref 0–99)
LDLC/HDLC SERPL: 2.1 RATIO (ref 0–3.2)
LYMPHOCYTES # BLD AUTO: 0.9 X10E3/UL (ref 0.7–3.1)
LYMPHOCYTES NFR BLD AUTO: 16 %
MCH RBC QN AUTO: 30.2 PG (ref 26.6–33)
MCHC RBC AUTO-ENTMCNC: 34.7 G/DL (ref 31.5–35.7)
MCV RBC AUTO: 87 FL (ref 79–97)
MONOCYTES # BLD AUTO: 0.7 X10E3/UL (ref 0.1–0.9)
MONOCYTES NFR BLD AUTO: 12 %
NEUTROPHILS # BLD AUTO: 3.5 X10E3/UL (ref 1.4–7)
NEUTROPHILS NFR BLD AUTO: 65 %
PLATELET # BLD AUTO: 225 X10E3/UL (ref 150–450)
POTASSIUM SERPL-SCNC: 4.9 MMOL/L (ref 3.5–5.2)
PROT SERPL-MCNC: 6.8 G/DL (ref 6–8.5)
RBC # BLD AUTO: 4.63 X10E6/UL (ref 3.77–5.28)
SODIUM SERPL-SCNC: 144 MMOL/L (ref 134–144)
TRIGL SERPL-MCNC: 83 MG/DL (ref 0–149)
VLDLC SERPL CALC-MCNC: 14 MG/DL (ref 5–40)
WBC # BLD AUTO: 5.4 X10E3/UL (ref 3.4–10.8)

## 2023-02-13 NOTE — PROGRESS NOTES
Blood counts, kidney liver, electrolyte panel normal.  Please notify Celine that her cholesterol panel is about the same as it was last year but with just some mild elevations.  We will call when urine culture returns

## 2023-02-14 LAB
BACTERIA UR CULT: NORMAL
BACTERIA UR CULT: NORMAL

## 2023-03-07 ENCOUNTER — OFFICE VISIT (OUTPATIENT)
Dept: FAMILY MEDICINE CLINIC | Facility: CLINIC | Age: 58
End: 2023-03-07
Payer: MEDICARE

## 2023-03-07 VITALS
BODY MASS INDEX: 22.53 KG/M2 | OXYGEN SATURATION: 100 % | SYSTOLIC BLOOD PRESSURE: 118 MMHG | TEMPERATURE: 98 F | HEIGHT: 65 IN | HEART RATE: 75 BPM | WEIGHT: 135.2 LBS | DIASTOLIC BLOOD PRESSURE: 66 MMHG

## 2023-03-07 DIAGNOSIS — F41.9 ANXIETY: ICD-10-CM

## 2023-03-07 DIAGNOSIS — F33.41 RECURRENT MAJOR DEPRESSIVE DISORDER, IN PARTIAL REMISSION: Primary | ICD-10-CM

## 2023-03-07 PROCEDURE — 99214 OFFICE O/P EST MOD 30 MIN: CPT | Performed by: NURSE PRACTITIONER

## 2023-03-07 RX ORDER — BUSPIRONE HYDROCHLORIDE 5 MG/1
5 TABLET ORAL 3 TIMES DAILY
Qty: 90 TABLET | Refills: 0 | Status: SHIPPED | OUTPATIENT
Start: 2023-03-07

## 2023-03-07 NOTE — PROGRESS NOTES
"Chief Complaint  Anxiety, Depression (Mid to late Jan, just gotten worse, making things harder for her at school, wanting to finish her masters. Can't concentrate very well), and Fatigue    Subjective          Celine Musa presents to [facility] INTER[facility] MEDICINE      History of Present Illness    Celine is a 58-year-old female patient of Chantale Julien who presents today for depression and anxiety. Patient has not seen Chantale Julien since 5/16/2022.     Patient reports her anxiety and depression have been getting worse since sometime in January.  I saw patient February 10, 2023 when she had been out of her Wellbutrin for about 3 months.  She came in asking for refill as her mother had passed away.  Restarted her on Wellbutrin 300 mg daily 90 tablets plus a refill.    She is trying to finish her masters degree and is having difficulty concentrating.  She is also fatigued. She has one to two years of her program left to complete. She tells me she is having difficulty retaining what she reads which triggers anxiety.  Her depression is up and down and never predictable.  She has no SI, HI, want to self-harm. She tells me the Wellbutrin is not helping get her over the hump of her depression or anxiety. She has seen therapist in the past and tells me this has not worked for her previously.  She does not want to see therapist again.  She also reports she has seen psychiatry without any improvement. She tells me she was on Zoloft before but couldn't take due to her glaucoma.      Objective     Vital Signs:   /66 (BP Location: Right arm, Patient Position: Sitting, Cuff Size: Adult)   Pulse 75   Temp 98 °F (36.7 °C) (Oral)   Ht 165.6 cm (65.2\")   Wt 61.3 kg (135 lb 3.2 oz)   SpO2 100%   BMI 22.36 kg/m²           Physical Exam  Constitutional:       Appearance: She is well-developed.      Comments: Wearing a face mask     HENT:      Head: Normocephalic and atraumatic.   Eyes:      " Conjunctiva/sclera: Conjunctivae normal.      Pupils: Pupils are equal, round, and reactive to light.   Cardiovascular:      Rate and Rhythm: Normal rate.   Pulmonary:      Effort: Pulmonary effort is normal.   Musculoskeletal:         General: Normal range of motion.      Cervical back: Normal range of motion.   Skin:     General: Skin is warm and dry.      Findings: No rash.   Neurological:      Mental Status: She is alert and oriented to person, place, and time.   Psychiatric:         Attention and Perception: Attention normal.         Mood and Affect: Mood normal. Affect is flat.         Speech: Speech normal.         Behavior: Behavior normal. Behavior is cooperative.         Thought Content: Thought content normal. Thought content is not paranoid or delusional. Thought content does not include homicidal or suicidal ideation. Thought content does not include homicidal or suicidal plan.            Result Review :                           Assessment and Plan      Diagnoses and all orders for this visit:    1. Recurrent major depressive disorder, in partial remission (HCC) (Primary)  Assessment & Plan:  Patient's depression is recurrent and is mild without psychosis. Their depression is currently in partial remission and the condition is unchanged. This will be reassessed in 4 weeks. F/U as described:Going to keep patient on Wellbutrin 300 mg daily.  Patient does not want to see psychiatry as she reports this is not helped.  She is also not interested in a therapist.  We will have her follow-up with PCP Chantale Julien.      2. Anxiety  Assessment & Plan:  For patient's anxiety adding buspirone 3 times daily.  Instructed on potential side effects and unwanted symptoms that may occur.  Patient follow-up with Chantale Julien    Orders:  -     busPIRone (BUSPAR) 5 MG tablet; Take 1 tablet by mouth 3 (Three) Times a Day.  Dispense: 90 tablet; Refill: 0          Follow Up       Return in about 4 weeks (around 4/4/2023)  for with Chantale Julien NP.      Patient was given instructions and counseling regarding her condition or for health maintenance advice. Please see specific information pulled into the AVS if appropriate.     Kate Bower, APRN3/7/[id number]:24 EST  This note has been electronically signed

## 2023-03-07 NOTE — ASSESSMENT & PLAN NOTE
For patient's anxiety adding buspirone 3 times daily.  Instructed on potential side effects and unwanted symptoms that may occur.  Patient follow-up with Chantale Julien

## 2023-03-07 NOTE — ASSESSMENT & PLAN NOTE
Patient's depression is recurrent and is mild without psychosis. Their depression is currently in partial remission and the condition is unchanged. This will be reassessed in 4 weeks. F/U as described:Going to keep patient on Wellbutrin 300 mg daily.  Patient does not want to see psychiatry as she reports this is not helped.  She is also not interested in a therapist.  We will have her follow-up with PCP Chantale Julien.

## 2023-04-25 ENCOUNTER — TELEPHONE (OUTPATIENT)
Dept: FAMILY MEDICINE CLINIC | Facility: CLINIC | Age: 58
End: 2023-04-25

## 2023-04-25 NOTE — TELEPHONE ENCOUNTER
Caller: Celine Strong    Relationship to patient: Self    Best call back number: [phone]    Patient is needing:   PATIENT CALLED TO ASK WHEN HER APPOINTMENT TIME WAS THIS MORNING, [facility] ADVISED THE PATIENT IS HAD PASSED AND SHE WAS NO SHOWED.     [facility] OFFERED TO RESCHEDULE FOR THE PATIENT, SHE DECLINED THE NEXT AVAILABLE WITH ANTONIETA BARKSDALE AND SAID SHE DID NOT WISH TO WAIT UNTIL MAY TO SEE HER OTHERWISE, PATIENT REQUESTED TO SCHEDULE WITH JOI BETHEA    [facility] SCHEDULED HER MEDICARE WELLNESS FOR 05/02/23 WITH JOI BETHEA

## 2023-05-04 ENCOUNTER — OFFICE VISIT (OUTPATIENT)
Dept: FAMILY MEDICINE CLINIC | Facility: CLINIC | Age: 58
End: 2023-05-04
Payer: MEDICARE

## 2023-05-04 VITALS
WEIGHT: 141 LBS | SYSTOLIC BLOOD PRESSURE: 114 MMHG | OXYGEN SATURATION: 97 % | TEMPERATURE: 97.3 F | DIASTOLIC BLOOD PRESSURE: 68 MMHG | HEART RATE: 86 BPM | HEIGHT: 65 IN | BODY MASS INDEX: 23.49 KG/M2

## 2023-05-04 DIAGNOSIS — Z00.00 ENCOUNTER FOR ANNUAL WELLNESS VISIT (AWV) IN MEDICARE PATIENT: Primary | ICD-10-CM

## 2023-05-04 DIAGNOSIS — M54.41 CHRONIC MIDLINE LOW BACK PAIN WITH RIGHT-SIDED SCIATICA: ICD-10-CM

## 2023-05-04 DIAGNOSIS — M47.816 ARTHRITIS OF LUMBAR SPINE: ICD-10-CM

## 2023-05-04 DIAGNOSIS — M54.31 RIGHT SIDED SCIATICA: ICD-10-CM

## 2023-05-04 DIAGNOSIS — Z12.31 SCREENING MAMMOGRAM FOR BREAST CANCER: ICD-10-CM

## 2023-05-04 DIAGNOSIS — G89.29 CHRONIC MIDLINE LOW BACK PAIN WITH RIGHT-SIDED SCIATICA: ICD-10-CM

## 2023-05-04 DIAGNOSIS — Z12.4 PAP SMEAR FOR CERVICAL CANCER SCREENING: ICD-10-CM

## 2023-05-04 RX ORDER — METHYLPREDNISOLONE 4 MG/1
TABLET ORAL
Qty: 21 EACH | Refills: 0 | Status: SHIPPED | OUTPATIENT
Start: 2023-05-04 | End: 2023-05-09

## 2023-05-04 RX ORDER — AMOXICILLIN AND CLAVULANATE POTASSIUM 875; 125 MG/1; MG/1
TABLET, FILM COATED ORAL
COMMUNITY
Start: 2023-04-28

## 2023-05-04 NOTE — PROGRESS NOTES
The ABCs of the Annual Wellness Visit  Subsequent Medicare Wellness Visit    Subjective    Celine Musa is a 58 y.o. female who presents for a Subsequent Medicare Wellness Visit.    The following portions of the patient's history were reviewed and   updated as appropriate: allergies, current medications, past family history, past medical history, past social history, past surgical history and problem list.    Compared to one year ago, the patient feels her physical   health is worse.    Compared to one year ago, the patient feels her mental   health is the same. Still attending counseling sessions for mental health. She follows psychiatry at [facility] tells me she was dx with PTSD and advised to attend EMDR sessions for therapy.     Recent Hospitalizations:  She was not admitted to the hospital during the last year.       Current Medical Providers:  Patient Care Team:  Chantale Julien APRN as PCP - General (Nurse Practitioner)    Outpatient Medications Prior to Visit   Medication Sig Dispense Refill   • albuterol sulfate HFA (Ventolin HFA) 108 (90 Base) MCG/ACT inhaler Inhale 2 puffs Every 4 (Four) Hours As Needed for Wheezing. 18.2 g 6   • Alphagan P 0.1 % solution ophthalmic solution Administer 1 drop to both eyes 2 (two) times a day.     • amoxicillin-clavulanate (AUGMENTIN) 875-125 MG per tablet TAKE 1 TABLET BY MOUTH EVERY 12 HOURS WITH FOOD FOR 10 DAYS     • buPROPion XL (WELLBUTRIN XL) 300 MG 24 hr tablet Take 1 tablet by mouth Daily. 90 tablet 1   • busPIRone (BUSPAR) 5 MG tablet Take 1 tablet by mouth 3 (Three) Times a Day. 90 tablet 0   • Cholecalciferol 25 MCG (1000 UT) capsule Take  by mouth.     • fluticasone (FLONASE) 50 MCG/ACT nasal spray 2 sprays into the nostril(s) as directed by provider Daily. 16 g 5   • ibuprofen (ADVIL,MOTRIN) 800 MG tablet Take 1 tablet by mouth Every 8 (Eight) Hours As Needed for Mild Pain . 15 tablet 0   • meclizine (ANTIVERT) 12.5 MG tablet 1-2 tabs q8h prn 30 tablet  2   • omeprazole (PrilOSEC) 20 MG capsule Take 1 capsule by mouth Daily. 30 capsule 2   • ondansetron (Zofran) 4 MG tablet Take 1 tablet by mouth Every 8 (Eight) Hours As Needed for Nausea or Vomiting. 30 tablet 2   • pravastatin (PRAVACHOL) 10 MG tablet Take 1 tablet by mouth Daily. 30 tablet 2     No facility-administered medications prior to visit.       No opioid medication identified on active medication list. I have reviewed chart for other potential  high risk medication/s and harmful drug interactions in the elderly.          Aspirin is not on active medication list.  Aspirin use is not indicated based on review of current medical condition/s. Risk of harm outweighs potential benefits.  .    Patient Active Problem List   Diagnosis   • Allergic rhinitis due to pollen   • Anxiety associated with depression   • Acid reflux   • Inflammation of sacroiliac joint   • History of asthma   • Glaucoma   • Constipation   • Cognitive impairment   • Multiple sclerosis   • History of anemia   • Neck pain   • Chronic midline low back pain with bilateral sciatica   • Osteoarthritis cervical spine   • Meningioma   • Bilateral pseudophakia   • Keratoconjunctivitis sicca not due to Sjogren's syndrome   • Primary open angle glaucoma (POAG) of both eyes   • Cat bite of multiple sites of right hand and fingers with infection   • Encounter for wound re-check   • Cat bite   • Left hand pain   • Encounter for annual wellness visit (AWV) in Medicare patient   • Acute cystitis without hematuria   • Urinary tract infection without hematuria   • Anxiety   • Recurrent major depressive disorder, in partial remission   • Right sided sciatica   • Screening mammogram for breast cancer   • Chronic midline low back pain with right-sided sciatica   • Arthritis of lumbar spine   • Pap smear for cervical cancer screening     Advance Care Planning   Advance Care Planning     Advance Directive is not on file.  ACP discussion was declined by the  "patient. Patient does not have an advance directive, declines further assistance.     Objective    Vitals:    23 0902   BP: 114/68   BP Location: Right arm   Patient Position: Sitting   Cuff Size: Adult   Pulse: 86   Temp: 97.3 °F (36.3 °C)   TempSrc: Infrared   SpO2: 97%   Weight: 64 kg (141 lb)   Height: 165.6 cm (65.2\")     Estimated body mass index is 23.32 kg/m² as calculated from the following:    Height as of this encounter: 165.6 cm (65.2\").    Weight as of this encounter: 64 kg (141 lb).    BMI is within normal parameters. No other follow-up for BMI required.      Does the patient have evidence of cognitive impairment? No    Lab Results   Component Value Date    CHLPL 231 (H) 02/10/2023    TRIG 83 02/10/2023    HDL 70 02/10/2023     (H) 02/10/2023    VLDL 14 02/10/2023        HEALTH RISK ASSESSMENT    Smoking Status:  Social History     Tobacco Use   Smoking Status Former   • Packs/day: 0.25   • Years: 4.00   • Pack years: 1.00   • Types: Cigarettes   • Start date:    • Quit date: 1989   • Years since quittin.3   Smokeless Tobacco Never     Alcohol Consumption:  Social History     Substance and Sexual Activity   Alcohol Use Never     Fall Risk Screen:    KAYCE Fall Risk Assessment has not been completed.    Depression Screenin/4/2023     9:12 AM   PHQ-2/PHQ-9 Depression Screening   Little Interest or Pleasure in Doing Things 2-->more than half the days   Feeling Down, Depressed or Hopeless 3-->nearly every day   Trouble Falling or Staying Asleep, or Sleeping Too Much 3-->nearly every day   Feeling Tired or Having Little Energy 3-->nearly every day   Poor Appetite or Overeating 3-->nearly every day   Feeling Bad about Yourself - or that You are a Failure or Have Let Yourself or Your Family Down 0-->not at all   Trouble Concentrating on Things, Such as Reading the Newspaper or Watching Television 3-->nearly every day   Moving or Speaking So Slowly that Other People Could " Have Noticed? Or the Opposite - Being So Fidgety 0-->not at all   Thoughts that You Would be Better Off Dead or of Hurting Yourself in Some Way 0-->not at all   PHQ-9: Brief Depression Severity Measure Score 17   If You Checked Off Any Problems, How Difficult Have These Problems Made It For You to Do Your Work, Take Care of Things at Home, or Get Along with Other People? very difficult       Health Habits and Functional and Cognitive Screenin/4/2023     9:09 AM   Functional & Cognitive Status   Do you have difficulty preparing food and eating? No   Do you have difficulty bathing yourself, getting dressed or grooming yourself? Yes   Do you have difficulty using the toilet? No   Do you have difficulty moving around from place to place? Yes   Do you have trouble with steps or getting out of a bed or a chair? Yes   Current Diet Well Balanced Diet   Dental Exam Not up to date   Eye Exam Up to date   Exercise (times per week) 0 times per week   Current Exercises Include No Regular Exercise   Do you need help using the phone?  No   Are you deaf or do you have serious difficulty hearing?  No   Do you need help with transportation? No   Do you need help shopping? No   Do you need help preparing meals?  No   Do you need help with housework?  Yes   Do you need help with laundry? Yes   Do you need help taking your medications? No   Do you need help managing money? No   Do you ever drive or ride in a car without wearing a seat belt? No       Age-appropriate Screening Schedule:  Refer to the list below for future screening recommendations based on patient's age, sex and/or medical conditions. Orders for these recommended tests are listed in the plan section. The patient has been provided with a written plan.    Health Maintenance   Topic Date Due   • MAMMOGRAM  Never done   • COVID-19 Vaccine (1) Never done   • ZOSTER VACCINE (1 of 2) Never done   • HEPATITIS C SCREENING  Never done   • PAP SMEAR  Never done   •  INFLUENZA VACCINE  08/01/2023   • LIPID PANEL  02/10/2024   • ANNUAL WELLNESS VISIT  05/04/2024   • COLORECTAL CANCER SCREENING  06/29/2024   • TDAP/TD VACCINES (2 - Td or Tdap) 06/24/2028   • Pneumococcal Vaccine 0-64  Aged Out                  CMS Preventative Services Quick Reference  Risk Factors Identified During Encounter  Depression/Dysphoria: Continue seeing psych and cousleing.   The above risks/problems have been discussed with the patient.  Pertinent information has been shared with the patient in the After Visit Summary.  An After Visit Summary and PPPS were made available to the patient.    Due for mammogram - orderd. Pt wants at [facility]    Follow Up:   Next Medicare Wellness visit to be scheduled in 1 year.       Additional E&M Note during same encounter follows:  Patient has multiple medical problems which are significant and separately identifiable that require additional work above and beyond the Medicare Wellness Visit.        Chief Complaint  Medicare Wellness-subsequent    Subjective        HPI  Celine Musa is also being seen today for back pain. She has hx of MS. Did see Dr More but retired, now sees Dr. Choe and the APRN. Saw neurology yesterday also.     She went to urgent care yesterday in [place], [place] for back pain and dental abscess. Was given Augmentin script.  Will request records.      She tells me pain is on right lumbar spine and hip and radiating down right leg, sharp. She has chronic midline back pain since 2019. Last spinal xray here was 2019. Most recent was 4/28/2023 at [facility]. Straightened lordosis, grade 1 anterolisthesis, L4-L5 degeneration and facet arthropathy. L4-L5 disc height loss. Spondylosis. Has never seen neurosurgeon but has an appt to see them on Friday for referral from neurosurgery.      Objective   Vital Signs:  /68 (BP Location: Right arm, Patient Position: Sitting, Cuff Size: Adult)   Pulse 86   Temp 97.3 °F (36.3 °C) (Infrared)    "Ht 165.6 cm (65.2\")   Wt 64 kg (141 lb)   SpO2 97%   BMI 23.32 kg/m²     Physical Exam  Vitals reviewed.   Constitutional:       Appearance: She is well-developed.      Comments: Wearing a face mask     HENT:      Head: Normocephalic and atraumatic.      Nose: Nose normal.      Mouth/Throat:      Mouth: Mucous membranes are moist.   Eyes:      Conjunctiva/sclera: Conjunctivae normal.      Pupils: Pupils are equal, round, and reactive to light.   Cardiovascular:      Rate and Rhythm: Normal rate and regular rhythm.      Pulses: Normal pulses.      Heart sounds: Normal heart sounds. No murmur heard.  Pulmonary:      Effort: Pulmonary effort is normal. No respiratory distress.   Musculoskeletal:      Cervical back: Normal range of motion.      Lumbar back: Tenderness present. No bony tenderness. Positive right straight leg raise test.        Back:       Comments: Areas of tenderness to palpation   Skin:     General: Skin is warm and dry.      Findings: No rash.   Neurological:      Mental Status: She is alert and oriented to person, place, and time.   Psychiatric:         Attention and Perception: Attention normal.         Mood and Affect: Mood normal. Affect is flat.         Speech: Speech normal.         Behavior: Behavior normal. Behavior is cooperative.         Thought Content: Thought content normal. Thought content is not paranoid or delusional. Thought content does not include homicidal or suicidal ideation. Thought content does not include homicidal or suicidal plan.                         Assessment and Plan   Diagnoses and all orders for this visit:    1. Encounter for annual wellness visit (AWV) in Medicare patient (Primary)    2. Chronic midline low back pain with right-sided sciatica    3. Arthritis of lumbar spine    4. Right sided sciatica    5. Pap smear for cervical cancer screening  -     Ambulatory Referral to Obstetrics / Gynecology    6. Screening mammogram for breast cancer  -     Mammo " Screening Digital Tomosynthesis Bilateral With CAD; Future    Other orders  -     methylPREDNISolone (MEDROL) 4 MG dose pack; Take as directed on package instructions.  Dispense: 21 each; Refill: 0      Annual wellness visit completed.  Patient overall stable and doing similar to last year however she feels like her back issue is progressing.  She is with a right-sided sciatica today we will treat with a Medrol pack.  She is past due for mammogram and Pap smear.  She would like to have both of these done at [facility].  Referring to Dr. Hernan Alonso.  Advised they will schedule the mammogram for her and to have them fax us the results.  Patient already has an appoint with neurosurgery on Friday in [place].  Advised her to follow-up with us after the discuss options             Follow Up   No follow-ups on file.  Patient was given instructions and counseling regarding her condition or for health maintenance advice. Please see specific information pulled into the AVS if appropriate.

## 2023-05-25 ENCOUNTER — OFFICE VISIT (OUTPATIENT)
Dept: FAMILY MEDICINE CLINIC | Facility: CLINIC | Age: 58
End: 2023-05-25
Payer: MEDICARE

## 2023-05-25 VITALS
DIASTOLIC BLOOD PRESSURE: 68 MMHG | SYSTOLIC BLOOD PRESSURE: 114 MMHG | HEART RATE: 76 BPM | WEIGHT: 138.6 LBS | OXYGEN SATURATION: 96 % | HEIGHT: 66 IN | BODY MASS INDEX: 22.28 KG/M2 | TEMPERATURE: 97.1 F

## 2023-05-25 DIAGNOSIS — H61.23 BILATERAL IMPACTED CERUMEN: Primary | ICD-10-CM

## 2023-05-25 RX ORDER — BRIMONIDINE TARTRATE 2 MG/ML
1 SOLUTION/ DROPS OPHTHALMIC 2 TIMES DAILY
COMMUNITY
Start: 2023-05-17

## 2023-05-25 NOTE — PROGRESS NOTES
"Celine Musa is a 58 y.o. female.     History of Present Illness  58-year-old white female with chronic pain, asthma, anxiety who comes in today complaining of left ear pain.  On examination both ears had cerumen impaction.  Right ear was successfully irrigated-has a bulging TM with clear fluid.  Left ear however were unable to clear impaction we will put on Debrox eardrops and come back Tuesday to try to remove earwax again    Vital signs are stable          Debrox eardrops as directed  Follow-up Tuesday               The following portions of the patient's history were reviewed and updated as appropriate: allergies, current medications, past family history, past medical history, past social history, past surgical history and problem list.    Vitals:    05/25/23 1117   BP: 114/68   BP Location: Right arm   Patient Position: Sitting   Cuff Size: Adult   Pulse: 76   Temp: 97.1 °F (36.2 °C)   TempSrc: Temporal   SpO2: 96%   Weight: 62.9 kg (138 lb 9.6 oz)   Height: 167.6 cm (66\")     Body mass index is 22.37 kg/m².    Past Medical History:   Diagnosis Date   • Depression    • GERD (gastroesophageal reflux disease)    • Hyperlipidemia    • Multiple sclerosis      Past Surgical History:   Procedure Laterality Date   • EYE SURGERY     • KNEE ARTHROSCOPY Left      Family History   Problem Relation Age of Onset   • Hyperlipidemia Mother    • Hyperlipidemia Father    • Heart disease Father    • Leukemia Father    • No Known Problems Son    • No Known Problems Half-Sister    • No Known Problems Half-Sister    • No Known Problems Son      Immunization History   Administered Date(s) Administered   • Flu Vaccine Quad PF 6-35MO 12/14/2017   • H1N1 Inj 11/14/2009   • Tdap 06/24/2018       Office Visit on 02/10/2023   Component Date Value Ref Range Status   • Color 02/10/2023 Yellow  Yellow, Straw, Dark Yellow, Sadaf Final   • Clarity, UA 02/10/2023 Clear  Clear Final   • Specific Gravity  02/10/2023 1.015  1.005 - " 1.030 Final   • pH, Urine 02/10/2023 6.0  5.0 - 8.0 Final   • Leukocytes 02/10/2023 Moderate (2+) (A)  Negative Final   • Nitrite, UA 02/10/2023 Negative  Negative Final   • Protein, POC 02/10/2023 Negative  Negative mg/dL Final   • Glucose, UA 02/10/2023 Negative  Negative mg/dL Final   • Ketones, UA 02/10/2023 Negative  Negative Final   • Urobilinogen, UA 02/10/2023 Normal  Normal, 0.2 E.U./dL Final   • Bilirubin 02/10/2023 Negative  Negative Final   • Blood, UA 02/10/2023 Negative  Negative Final   • Lot Number 02/10/2023 [id number]   Final   • Expiration Date 02/10/2023 11,302,023   Final   • Urine Culture 02/10/2023 Final report   Final   • Result 1 02/10/2023 Comment   Final    Comment: Mixed urogenital joel  25,000-50,000 colony forming units per mL     • WBC 02/10/2023 5.4  3.4 - 10.8 x10E3/uL Final   • RBC 02/10/2023 4.63  3.77 - 5.28 x10E6/uL Final   • Hemoglobin 02/10/2023 14.0  11.1 - 15.9 g/dL Final   • Hematocrit 02/10/2023 40.3  34.0 - 46.6 % Final   • MCV 02/10/2023 87  79 - 97 fL Final   • MCH 02/10/2023 30.2  26.6 - 33.0 pg Final   • MCHC 02/10/2023 34.7  31.5 - 35.7 g/dL Final   • RDW 02/10/2023 13.8  11.7 - 15.4 % Final   • Platelets 02/10/2023 225  150 - 450 x10E3/uL Final   • Neutrophil Rel % 02/10/2023 65  Not Estab. % Final   • Lymphocyte Rel % 02/10/2023 16  Not Estab. % Final   • Monocyte Rel % 02/10/2023 12  Not Estab. % Final   • Eosinophil Rel % 02/10/2023 6  Not Estab. % Final   • Basophil Rel % 02/10/2023 1  Not Estab. % Final   • Neutrophils Absolute 02/10/2023 3.5  1.4 - 7.0 x10E3/uL Final   • Lymphocytes Absolute 02/10/2023 0.9  0.7 - 3.1 x10E3/uL Final   • Monocytes Absolute 02/10/2023 0.7  0.1 - 0.9 x10E3/uL Final   • Eosinophils Absolute 02/10/2023 0.4  0.0 - 0.4 x10E3/uL Final   • Basophils Absolute 02/10/2023 0.1  0.0 - 0.2 x10E3/uL Final   • Immature Granulocyte Rel % 02/10/2023 0  Not Estab. % Final   • Immature Grans Absolute 02/10/2023 0.0  0.0 - 0.1 x10E3/uL Final   •  Glucose 02/10/2023 96  70 - 99 mg/dL Final   • BUN 02/10/2023 17  6 - 24 mg/dL Final   • Creatinine 02/10/2023 0.90  0.57 - 1.00 mg/dL Final   • EGFR Result 02/10/2023 74  >59 mL/min/1.73 Final   • BUN/Creatinine Ratio 02/10/2023 19  9 - 23 Final   • Sodium 02/10/2023 144  134 - 144 mmol/L Final   • Potassium 02/10/2023 4.9  3.5 - 5.2 mmol/L Final   • Chloride 02/10/2023 105  96 - 106 mmol/L Final   • Total CO2 02/10/2023 23  20 - 29 mmol/L Final   • Calcium 02/10/2023 9.8  8.7 - 10.2 mg/dL Final   • Total Protein 02/10/2023 6.8  6.0 - 8.5 g/dL Final   • Albumin 02/10/2023 4.5  3.8 - 4.9 g/dL Final   • Globulin 02/10/2023 2.3  1.5 - 4.5 g/dL Final   • A/G Ratio 02/10/2023 2.0  1.2 - 2.2 Final   • Total Bilirubin 02/10/2023 0.4  0.0 - 1.2 mg/dL Final   • Alkaline Phosphatase 02/10/2023 70  44 - 121 IU/L Final   • AST (SGOT) 02/10/2023 19  0 - 40 IU/L Final   • ALT (SGPT) 02/10/2023 29  0 - 32 IU/L Final   • Total Cholesterol 02/10/2023 231 (H)  100 - 199 mg/dL Final   • Triglycerides 02/10/2023 83  0 - 149 mg/dL Final   • HDL Cholesterol 02/10/2023 70  >39 mg/dL Final   • VLDL Cholesterol [place] 02/10/2023 14  5 - 40 mg/dL Final   • LDL Chol Calc (NIH) 02/10/2023 147 (H)  0 - 99 mg/dL Final   • LDL/HDL RATIO 02/10/2023 2.1  0.0 - 3.2 ratio Final    Comment:                                     LDL/HDL Ratio                                              Men  Women                                1/2 Avg.Risk  1.0    1.5                                    Avg.Risk  3.6    3.2                                 2X Avg.Risk  6.2    5.0                                 3X Avg.Risk  8.0    6.1           Review of Systems   Constitutional: Negative.    HENT: Positive for ear pain.    Respiratory: Negative.    Cardiovascular: Negative.    Gastrointestinal: Negative.    Genitourinary: Negative.    Musculoskeletal: Negative.    Skin: Negative.    Neurological: Negative.    Psychiatric/Behavioral: Negative.        Objective   Physical  Exam  Constitutional:       Appearance: Normal appearance.   Cardiovascular:      Rate and Rhythm: Normal rate and regular rhythm.      Pulses: Normal pulses.      Heart sounds: Normal heart sounds.   Pulmonary:      Effort: Pulmonary effort is normal.      Breath sounds: Normal breath sounds.   Abdominal:      General: Bowel sounds are normal.   Musculoskeletal:         General: Normal range of motion.   Skin:     General: Skin is warm and dry.   Neurological:      General: No focal deficit present.      Mental Status: She is alert and oriented to person, place, and time.   Psychiatric:         Mood and Affect: Mood normal.         Behavior: Behavior normal.         Ear Cerumen Removal    Date/Time: 5/25/2023 11:46 AM  Performed by: Chantale Julien APRN  Authorized by: Chantale Julien APRN     Anesthesia:  Local Anesthetic: none  Location details: left ear and right ear  Patient tolerance: patient tolerated the procedure well with no immediate complications  Procedure type: irrigation   Sedation:  Patient sedated: no            Assessment & Plan   Problems Addressed this Visit    None  Visit Diagnoses     Bilateral impacted cerumen    -  Primary      Diagnoses       Codes Comments    Bilateral impacted cerumen    -  Primary ICD-10-CM: H61.23  ICD-9-CM: 380.4             Current Outpatient Medications:   •  albuterol sulfate HFA (Ventolin HFA) 108 (90 Base) MCG/ACT inhaler, Inhale 2 puffs Every 4 (Four) Hours As Needed for Wheezing., Disp: 18.2 g, Rfl: 6  •  brimonidine (ALPHAGAN) 0.2 % ophthalmic solution, Administer 1 drop to both eyes 2 (Two) Times a Day., Disp: , Rfl:   •  buPROPion XL (WELLBUTRIN XL) 300 MG 24 hr tablet, Take 1 tablet by mouth Daily., Disp: 90 tablet, Rfl: 1  •  busPIRone (BUSPAR) 5 MG tablet, Take 1 tablet by mouth 3 (Three) Times a Day., Disp: 90 tablet, Rfl: 0  •  Cholecalciferol 25 MCG (1000 UT) capsule, Take  by mouth., Disp: , Rfl:   •  fluticasone (FLONASE) 50 MCG/ACT nasal spray, 2  sprays into the nostril(s) as directed by provider Daily., Disp: 16 g, Rfl: 5  •  ibuprofen (ADVIL,MOTRIN) 800 MG tablet, Take 1 tablet by mouth Every 8 (Eight) Hours As Needed for Mild Pain ., Disp: 15 tablet, Rfl: 0  •  meclizine (ANTIVERT) 12.5 MG tablet, 1-2 tabs q8h prn, Disp: 30 tablet, Rfl: 2  •  omeprazole (PrilOSEC) 20 MG capsule, Take 1 capsule by mouth Daily., Disp: 30 capsule, Rfl: 2  •  ondansetron (Zofran) 4 MG tablet, Take 1 tablet by mouth Every 8 (Eight) Hours As Needed for Nausea or Vomiting., Disp: 30 tablet, Rfl: 2  •  pravastatin (PRAVACHOL) 10 MG tablet, Take 1 tablet by mouth Daily., Disp: 30 tablet, Rfl: 2           Chantale Julien, APRN 5/25/2023 11:44 EDT  This note has been electronically signed

## 2023-06-06 ENCOUNTER — TELEPHONE (OUTPATIENT)
Dept: FAMILY MEDICINE CLINIC | Facility: CLINIC | Age: 58
End: 2023-06-06
Payer: MEDICARE

## 2023-06-06 NOTE — TELEPHONE ENCOUNTER
We did discuss this.  Patient had a list of counselors who participated in EMDR counseling.  She needs to contact them because I do not have that list.  We discussed this in great detail.

## 2023-06-06 NOTE — TELEPHONE ENCOUNTER
Pt states when she saw you the last time you discussed some treatment called FMDR.  She is interested in trying to find someone that does that.  She said that you told her to let her know if you needed the information.  She wishes to know the name of the provider if you are still willing.

## 2023-06-07 NOTE — TELEPHONE ENCOUNTER
Called pt and relayed message.  Unfortunately, she had contacted all of them and none of them accept her insurance.  She said she would try to do some more research and see if she can find someone.

## 2023-06-08 ENCOUNTER — OFFICE VISIT (OUTPATIENT)
Dept: FAMILY MEDICINE CLINIC | Facility: CLINIC | Age: 58
End: 2023-06-08
Payer: MEDICARE

## 2023-06-08 VITALS
OXYGEN SATURATION: 97 % | BODY MASS INDEX: 21.83 KG/M2 | HEART RATE: 75 BPM | DIASTOLIC BLOOD PRESSURE: 71 MMHG | HEIGHT: 66 IN | RESPIRATION RATE: 18 BRPM | WEIGHT: 135.8 LBS | TEMPERATURE: 97.3 F | SYSTOLIC BLOOD PRESSURE: 110 MMHG

## 2023-06-08 DIAGNOSIS — H61.22 CERUMEN DEBRIS ON TYMPANIC MEMBRANE OF LEFT EAR: Primary | ICD-10-CM

## 2023-06-08 NOTE — PROGRESS NOTES
Procedure   Ear Cerumen Removal    Date/Time: 6/8/2023 4:18 PM  Performed by: Chantale Julien APRN  Authorized by: Chantale Julien APRN     Anesthesia:  Local Anesthetic: none  Location details: left ear  Patient tolerance: patient tolerated the procedure well with no immediate complications  Procedure type: instrumentation, irrigation, curette   Sedation:  Patient sedated: no

## 2023-06-08 NOTE — PROGRESS NOTES
"    Celine Musa is a 58 y.o. female.     History of Present Illness  54-year-old white female with chronic pain, asthma, anxiety who was here on May 25 with cerebral infarction and liver unable to successfully irrigate left ear.  I put her on Debrox drops that she comes back today for irrigation.  Ear was successfully irrigated with a very large amount of cerumen removed.  Ear canals look fine TMs bulging with cloudy fluid from allergies.  Recommended patient get ears irrigated twice a year    Vital signs stable            Left ear irrigation  Recommend irrigation twice a year     The following portions of the patient's history were reviewed and updated as appropriate: allergies, current medications, past family history, past medical history, past social history, past surgical history, and problem list.    Vitals:    06/08/23 0846   BP: 110/71   BP Location: Right arm   Patient Position: Sitting   Cuff Size: Adult   Pulse: 75   Resp: 18   Temp: 97.3 °F (36.3 °C)   TempSrc: Infrared   SpO2: 97%   Weight: 61.6 kg (135 lb 12.8 oz)   Height: 167.6 cm (66\")     Body mass index is 21.92 kg/m².    Past Medical History:   Diagnosis Date    Depression     GERD (gastroesophageal reflux disease)     Hyperlipidemia     Multiple sclerosis      Past Surgical History:   Procedure Laterality Date    EYE SURGERY      KNEE ARTHROSCOPY Left      Family History   Problem Relation Age of Onset    Hyperlipidemia Mother     Hyperlipidemia Father     Heart disease Father     Leukemia Father     No Known Problems Son     No Known Problems Half-Sister     No Known Problems Half-Sister     No Known Problems Son      Immunization History   Administered Date(s) Administered    Flu Vaccine Quad PF 6-35MO 12/14/2017    H1N1 Inj 11/14/2009    Tdap 06/24/2018       Office Visit on 02/10/2023   Component Date Value Ref Range Status    Color 02/10/2023 Yellow  Yellow, Straw, Dark Yellow, Sadaf Final    Clarity, UA 02/10/2023 Clear  Clear " Final    Specific Gravity  02/10/2023 1.015  1.005 - 1.030 Final    pH, Urine 02/10/2023 6.0  5.0 - 8.0 Final    Leukocytes 02/10/2023 Moderate (2+) (A)  Negative Final    Nitrite, UA 02/10/2023 Negative  Negative Final    Protein, POC 02/10/2023 Negative  Negative mg/dL Final    Glucose, UA 02/10/2023 Negative  Negative mg/dL Final    Ketones, UA 02/10/2023 Negative  Negative Final    Urobilinogen, UA 02/10/2023 Normal  Normal, 0.2 E.U./dL Final    Bilirubin 02/10/2023 Negative  Negative Final    Blood, UA 02/10/2023 Negative  Negative Final    Lot Number 02/10/2023 [id number]   Final    Expiration Date 02/10/2023 11,302,023   Final    Urine Culture 02/10/2023 Final report   Final    Result 1 02/10/2023 Comment   Final    Comment: Mixed urogenital joel  25,000-50,000 colony forming units per mL      WBC 02/10/2023 5.4  3.4 - 10.8 x10E3/uL Final    RBC 02/10/2023 4.63  3.77 - 5.28 x10E6/uL Final    Hemoglobin 02/10/2023 14.0  11.1 - 15.9 g/dL Final    Hematocrit 02/10/2023 40.3  34.0 - 46.6 % Final    MCV 02/10/2023 87  79 - 97 fL Final    MCH 02/10/2023 30.2  26.6 - 33.0 pg Final    MCHC 02/10/2023 34.7  31.5 - 35.7 g/dL Final    RDW 02/10/2023 13.8  11.7 - 15.4 % Final    Platelets 02/10/2023 225  150 - 450 x10E3/uL Final    Neutrophil Rel % 02/10/2023 65  Not Estab. % Final    Lymphocyte Rel % 02/10/2023 16  Not Estab. % Final    Monocyte Rel % 02/10/2023 12  Not Estab. % Final    Eosinophil Rel % 02/10/2023 6  Not Estab. % Final    Basophil Rel % 02/10/2023 1  Not Estab. % Final    Neutrophils Absolute 02/10/2023 3.5  1.4 - 7.0 x10E3/uL Final    Lymphocytes Absolute 02/10/2023 0.9  0.7 - 3.1 x10E3/uL Final    Monocytes Absolute 02/10/2023 0.7  0.1 - 0.9 x10E3/uL Final    Eosinophils Absolute 02/10/2023 0.4  0.0 - 0.4 x10E3/uL Final    Basophils Absolute 02/10/2023 0.1  0.0 - 0.2 x10E3/uL Final    Immature Granulocyte Rel % 02/10/2023 0  Not Estab. % Final    Immature Grans Absolute 02/10/2023 0.0  0.0 - 0.1  x10E3/uL Final    Glucose 02/10/2023 96  70 - 99 mg/dL Final    BUN 02/10/2023 17  6 - 24 mg/dL Final    Creatinine 02/10/2023 0.90  0.57 - 1.00 mg/dL Final    EGFR Result 02/10/2023 74  >59 mL/min/1.73 Final    BUN/Creatinine Ratio 02/10/2023 19  9 - 23 Final    Sodium 02/10/2023 144  134 - 144 mmol/L Final    Potassium 02/10/2023 4.9  3.5 - 5.2 mmol/L Final    Chloride 02/10/2023 105  96 - 106 mmol/L Final    Total CO2 02/10/2023 23  20 - 29 mmol/L Final    Calcium 02/10/2023 9.8  8.7 - 10.2 mg/dL Final    Total Protein 02/10/2023 6.8  6.0 - 8.5 g/dL Final    Albumin 02/10/2023 4.5  3.8 - 4.9 g/dL Final    Globulin 02/10/2023 2.3  1.5 - 4.5 g/dL Final    A/G Ratio 02/10/2023 2.0  1.2 - 2.2 Final    Total Bilirubin 02/10/2023 0.4  0.0 - 1.2 mg/dL Final    Alkaline Phosphatase 02/10/2023 70  44 - 121 IU/L Final    AST (SGOT) 02/10/2023 19  0 - 40 IU/L Final    ALT (SGPT) 02/10/2023 29  0 - 32 IU/L Final    Total Cholesterol 02/10/2023 231 (H)  100 - 199 mg/dL Final    Triglycerides 02/10/2023 83  0 - 149 mg/dL Final    HDL Cholesterol 02/10/2023 70  >39 mg/dL Final    VLDL Cholesterol [place] 02/10/2023 14  5 - 40 mg/dL Final    LDL Chol Calc (NIH) 02/10/2023 147 (H)  0 - 99 mg/dL Final    LDL/HDL RATIO 02/10/2023 2.1  0.0 - 3.2 ratio Final    Comment:                                     LDL/HDL Ratio                                              Men  Women                                1/2 Avg.Risk  1.0    1.5                                    Avg.Risk  3.6    3.2                                 2X Avg.Risk  6.2    5.0                                 3X Avg.Risk  8.0    6.1           Review of Systems   Constitutional: Negative.    HENT:          Ear irrigation   Respiratory: Negative.     Cardiovascular: Negative.    Gastrointestinal: Negative.    Genitourinary: Negative.    Musculoskeletal: Negative.    Skin: Negative.    Neurological: Negative.    Psychiatric/Behavioral: Negative.       Objective   Physical  Exam  Constitutional:       Appearance: Normal appearance.   HENT:      Head: Normocephalic.      Ears:      Comments: Left ear successfully irrigated both TMs bulging with cloudy fluid due to allergies  Cardiovascular:      Rate and Rhythm: Normal rate and regular rhythm.      Pulses: Normal pulses.      Heart sounds: Normal heart sounds.   Pulmonary:      Effort: Pulmonary effort is normal.      Breath sounds: Normal breath sounds.   Abdominal:      General: Bowel sounds are normal.   Musculoskeletal:         General: Normal range of motion.   Skin:     General: Skin is warm and dry.   Neurological:      General: No focal deficit present.      Mental Status: She is alert and oriented to person, place, and time.   Psychiatric:         Mood and Affect: Mood normal.         Behavior: Behavior normal.       Ear Cerumen Removal    Date/Time: 6/8/2023 11:31 AM  Performed by: Chantale Julien APRN  Authorized by: Chantale Julien APRN     Anesthesia:  Local Anesthetic: none  Location details: left ear  Patient tolerance: patient tolerated the procedure well with no immediate complications  Procedure type: irrigation   Sedation:  Patient sedated: no          Assessment & Plan         Current Outpatient Medications:     albuterol sulfate HFA (Ventolin HFA) 108 (90 Base) MCG/ACT inhaler, Inhale 2 puffs Every 4 (Four) Hours As Needed for Wheezing., Disp: 18.2 g, Rfl: 6    brimonidine (ALPHAGAN) 0.2 % ophthalmic solution, Administer 1 drop to both eyes 2 (Two) Times a Day., Disp: , Rfl:     buPROPion XL (WELLBUTRIN XL) 300 MG 24 hr tablet, Take 1 tablet by mouth Daily., Disp: 90 tablet, Rfl: 1    busPIRone (BUSPAR) 5 MG tablet, Take 1 tablet by mouth 3 (Three) Times a Day., Disp: 90 tablet, Rfl: 0    carbamide peroxide (Debrox) 6.5 % otic solution, Administer 5 drops into the left ear 2 (Two) Times a Day., Disp: 15 mL, Rfl: 2    Cholecalciferol 25 MCG (1000 UT) capsule, Take  by mouth., Disp: , Rfl:     fluticasone (FLONASE)  50 MCG/ACT nasal spray, 2 sprays into the nostril(s) as directed by provider Daily., Disp: 16 g, Rfl: 5    ibuprofen (ADVIL,MOTRIN) 800 MG tablet, Take 1 tablet by mouth Every 8 (Eight) Hours As Needed for Mild Pain ., Disp: 15 tablet, Rfl: 0    meclizine (ANTIVERT) 12.5 MG tablet, 1-2 tabs q8h prn, Disp: 30 tablet, Rfl: 2    omeprazole (PrilOSEC) 20 MG capsule, Take 1 capsule by mouth Daily., Disp: 30 capsule, Rfl: 2    ondansetron (Zofran) 4 MG tablet, Take 1 tablet by mouth Every 8 (Eight) Hours As Needed for Nausea or Vomiting., Disp: 30 tablet, Rfl: 2    pravastatin (PRAVACHOL) 10 MG tablet, Take 1 tablet by mouth Daily., Disp: 30 tablet, Rfl: 2           Chantale Julien, APRN 6/8/2023 11:28 EDT  This note has been electronically signed

## 2023-08-04 ENCOUNTER — OFFICE VISIT (OUTPATIENT)
Dept: FAMILY MEDICINE CLINIC | Facility: CLINIC | Age: 58
End: 2023-08-04
Payer: MEDICARE

## 2023-08-04 VITALS
TEMPERATURE: 97 F | HEART RATE: 66 BPM | SYSTOLIC BLOOD PRESSURE: 121 MMHG | WEIGHT: 135 LBS | DIASTOLIC BLOOD PRESSURE: 77 MMHG | BODY MASS INDEX: 21.69 KG/M2 | HEIGHT: 66 IN | OXYGEN SATURATION: 98 %

## 2023-08-04 DIAGNOSIS — M54.2 CERVICAL PAIN (NECK): Primary | ICD-10-CM

## 2023-08-04 DIAGNOSIS — R19.5 CHANGE IN STOOL: ICD-10-CM

## 2023-08-04 DIAGNOSIS — K21.9 GASTROESOPHAGEAL REFLUX DISEASE WITHOUT ESOPHAGITIS: ICD-10-CM

## 2023-08-04 DIAGNOSIS — Z12.31 VISIT FOR SCREENING MAMMOGRAM: ICD-10-CM

## 2023-08-04 PROCEDURE — 1160F RVW MEDS BY RX/DR IN RCRD: CPT | Performed by: NURSE PRACTITIONER

## 2023-08-04 PROCEDURE — 99213 OFFICE O/P EST LOW 20 MIN: CPT | Performed by: NURSE PRACTITIONER

## 2023-08-04 PROCEDURE — 1159F MED LIST DOCD IN RCRD: CPT | Performed by: NURSE PRACTITIONER

## 2023-08-04 RX ORDER — CYCLOBENZAPRINE HCL 5 MG
5 TABLET ORAL 3 TIMES DAILY PRN
Qty: 15 TABLET | Refills: 0 | Status: SHIPPED | OUTPATIENT
Start: 2023-08-04

## 2023-08-04 RX ORDER — METHYLPREDNISOLONE 4 MG/1
TABLET ORAL
Qty: 21 EACH | Refills: 0 | Status: SHIPPED | OUTPATIENT
Start: 2023-08-04 | End: 2023-08-09

## 2023-08-05 LAB
ALBUMIN SERPL-MCNC: 4.8 G/DL (ref 3.8–4.9)
ALBUMIN/GLOB SERPL: 2.1 {RATIO} (ref 1.2–2.2)
ALP SERPL-CCNC: 62 IU/L (ref 44–121)
ALT SERPL-CCNC: 19 IU/L (ref 0–32)
AST SERPL-CCNC: 18 IU/L (ref 0–40)
BILIRUB SERPL-MCNC: 0.4 MG/DL (ref 0–1.2)
BUN SERPL-MCNC: 16 MG/DL (ref 6–24)
BUN/CREAT SERPL: 20 (ref 9–23)
CALCIUM SERPL-MCNC: 9.9 MG/DL (ref 8.7–10.2)
CHLORIDE SERPL-SCNC: 102 MMOL/L (ref 96–106)
CO2 SERPL-SCNC: 23 MMOL/L (ref 20–29)
CREAT SERPL-MCNC: 0.8 MG/DL (ref 0.57–1)
EGFRCR SERPLBLD CKD-EPI 2021: 85 ML/MIN/1.73
ERYTHROCYTE [DISTWIDTH] IN BLOOD BY AUTOMATED COUNT: 13.6 % (ref 11.7–15.4)
GLOBULIN SER CALC-MCNC: 2.3 G/DL (ref 1.5–4.5)
GLUCOSE SERPL-MCNC: 83 MG/DL (ref 70–99)
HCT VFR BLD AUTO: 42.2 % (ref 34–46.6)
HGB BLD-MCNC: 14.3 G/DL (ref 11.1–15.9)
MCH RBC QN AUTO: 30.1 PG (ref 26.6–33)
MCHC RBC AUTO-ENTMCNC: 33.9 G/DL (ref 31.5–35.7)
MCV RBC AUTO: 89 FL (ref 79–97)
PLATELET # BLD AUTO: 230 X10E3/UL (ref 150–450)
POTASSIUM SERPL-SCNC: 5.1 MMOL/L (ref 3.5–5.2)
PROT SERPL-MCNC: 7.1 G/DL (ref 6–8.5)
RBC # BLD AUTO: 4.75 X10E6/UL (ref 3.77–5.28)
SODIUM SERPL-SCNC: 141 MMOL/L (ref 134–144)
WBC # BLD AUTO: 7 X10E3/UL (ref 3.4–10.8)

## 2023-08-11 ENCOUNTER — TELEPHONE (OUTPATIENT)
Dept: FAMILY MEDICINE CLINIC | Facility: CLINIC | Age: 58
End: 2023-08-11
Payer: MEDICARE

## 2023-08-11 NOTE — TELEPHONE ENCOUNTER
Attempting to contact pt, no answer and vm box full, unable to leave message    Hub to read   Please advise patient she is with grade 1 disc slipping in the C4-C5 region that is very minimal and 4 mm is unchanged from last imaging.  She is with disc narrowing.  I would be happy to refer her to physical therapy or pain management if she is willing.  If she wants to follow-up with Chantale regarding that we will be fine

## 2023-08-14 NOTE — TELEPHONE ENCOUNTER
2nd attempt to contact pt, no answer and mail box full    Hub to read.    Please advise patient she is with grade 1 disc slipping in the C4-C5 region that is very minimal and 4 mm is unchanged from last imaging.  She is with disc narrowing.  I would be happy to refer her to physical therapy or pain management if she is willing.  If she wants to follow-up with Chantale regarding that we will be fine

## 2023-08-15 NOTE — TELEPHONE ENCOUNTER
3rd attempt to contact pt, no answer and vm box full, unable to leave message, printed xray results and put in mail today

## 2023-08-21 ENCOUNTER — OFFICE VISIT (OUTPATIENT)
Dept: FAMILY MEDICINE CLINIC | Facility: CLINIC | Age: 58
End: 2023-08-21
Payer: MEDICARE

## 2023-08-21 VITALS
DIASTOLIC BLOOD PRESSURE: 64 MMHG | TEMPERATURE: 97 F | OXYGEN SATURATION: 100 % | BODY MASS INDEX: 21.34 KG/M2 | HEART RATE: 80 BPM | WEIGHT: 132.8 LBS | HEIGHT: 66 IN | SYSTOLIC BLOOD PRESSURE: 118 MMHG

## 2023-08-21 DIAGNOSIS — J30.1 NON-SEASONAL ALLERGIC RHINITIS DUE TO POLLEN: Primary | ICD-10-CM

## 2023-08-21 DIAGNOSIS — J01.00 ACUTE NON-RECURRENT MAXILLARY SINUSITIS: ICD-10-CM

## 2023-08-21 PROCEDURE — 99213 OFFICE O/P EST LOW 20 MIN: CPT | Performed by: NURSE PRACTITIONER

## 2023-08-21 RX ORDER — AZITHROMYCIN 250 MG/1
TABLET, FILM COATED ORAL
Qty: 6 TABLET | Refills: 0 | Status: SHIPPED | OUTPATIENT
Start: 2023-08-21

## 2023-08-21 RX ORDER — CETIRIZINE HYDROCHLORIDE 10 MG/1
10 TABLET ORAL DAILY
Qty: 30 TABLET | Refills: 2 | Status: SHIPPED | OUTPATIENT
Start: 2023-08-21

## 2023-08-21 RX ORDER — PSEUDOEPHEDRINE HCL 30 MG
30 TABLET ORAL EVERY 4 HOURS PRN
Qty: 60 TABLET | Refills: 2 | Status: SHIPPED | OUTPATIENT
Start: 2023-08-21

## 2023-08-21 RX ORDER — FLUTICASONE PROPIONATE 50 MCG
2 SPRAY, SUSPENSION (ML) NASAL DAILY
Qty: 16 G | Refills: 5 | Status: SHIPPED | OUTPATIENT
Start: 2023-08-21

## 2023-08-21 NOTE — PROGRESS NOTES
"    Celine Musa is a 58 y.o. female.     History of Present Illness  58-year-old white female with chronic pain, asthma, anxiety who comes in today with complaints of left ear and left facial jaw pain.    Blood pressure 118/64 heart rate 80 she denies any chest pain, dyspnea, tachycardia or dizziness    On examination patient does have some maxillary sinusitis and acute allergy symptoms.  Place her on a Z-Omer and allergy medication advised to wear a mask when going outside            Z-Omer  Zyrtec/Sudafed/Flonase  Wear mask when going outside  Follow-up as needed     The following portions of the patient's history were reviewed and updated as appropriate: allergies, current medications, past family history, past medical history, past social history, past surgical history, and problem list.    Vitals:    08/21/23 0848   BP: 118/64   BP Location: Right arm   Patient Position: Sitting   Cuff Size: Adult   Pulse: 80   Temp: 97 øF (36.1 øC)   TempSrc: Infrared   SpO2: 100%   Weight: 60.2 kg (132 lb 12.8 oz)   Height: 167.6 cm (65.98\")     Body mass index is 21.44 kg/mý.    Past Medical History:   Diagnosis Date    Depression     GERD (gastroesophageal reflux disease)     Hyperlipidemia     Multiple sclerosis      Past Surgical History:   Procedure Laterality Date    EYE SURGERY      KNEE ARTHROSCOPY Left      Family History   Problem Relation Age of Onset    Hyperlipidemia Mother     Hyperlipidemia Father     Heart disease Father     Leukemia Father     No Known Problems Son     No Known Problems Half-Sister     No Known Problems Half-Sister     No Known Problems Son      Immunization History   Administered Date(s) Administered    Flu Vaccine Quad PF 6-35MO 12/14/2017    H1N1 Inj 11/14/2009    Tdap 06/24/2018       Office Visit on 08/04/2023   Component Date Value Ref Range Status    WBC 08/04/2023 7.0  3.4 - 10.8 x10E3/uL Final    RBC 08/04/2023 4.75  3.77 - 5.28 x10E6/uL Final    Hemoglobin 08/04/2023 14.3 "  11.1 - 15.9 g/dL Final    Hematocrit 08/04/2023 42.2  34.0 - 46.6 % Final    MCV 08/04/2023 89  79 - 97 fL Final    MCH 08/04/2023 30.1  26.6 - 33.0 pg Final    MCHC 08/04/2023 33.9  31.5 - 35.7 g/dL Final    RDW 08/04/2023 13.6  11.7 - 15.4 % Final    Platelets 08/04/2023 230  150 - 450 x10E3/uL Final    Glucose 08/04/2023 83  70 - 99 mg/dL Final    BUN 08/04/2023 16  6 - 24 mg/dL Final    Creatinine 08/04/2023 0.80  0.57 - 1.00 mg/dL Final    EGFR Result 08/04/2023 85  >59 mL/min/1.73 Final    BUN/Creatinine Ratio 08/04/2023 20  9 - 23 Final    Sodium 08/04/2023 141  134 - 144 mmol/L Final    Potassium 08/04/2023 5.1  3.5 - 5.2 mmol/L Final    Chloride 08/04/2023 102  96 - 106 mmol/L Final    Total CO2 08/04/2023 23  20 - 29 mmol/L Final    Calcium 08/04/2023 9.9  8.7 - 10.2 mg/dL Final    Total Protein 08/04/2023 7.1  6.0 - 8.5 g/dL Final    Albumin 08/04/2023 4.8  3.8 - 4.9 g/dL Final    Globulin 08/04/2023 2.3  1.5 - 4.5 g/dL Final    A/G Ratio 08/04/2023 2.1  1.2 - 2.2 Final    Total Bilirubin 08/04/2023 0.4  0.0 - 1.2 mg/dL Final    Alkaline Phosphatase 08/04/2023 62  44 - 121 IU/L Final    AST (SGOT) 08/04/2023 18  0 - 40 IU/L Final    ALT (SGPT) 08/04/2023 19  0 - 32 IU/L Final         Review of Systems   Constitutional: Negative.    HENT:  Positive for ear pain, postnasal drip and sinus pressure.    Respiratory: Negative.     Cardiovascular: Negative.    Gastrointestinal: Negative.    Genitourinary: Negative.    Musculoskeletal: Negative.    Skin: Negative.    Neurological: Negative.    Psychiatric/Behavioral: Negative.       Objective   Physical Exam  Constitutional:       Appearance: Normal appearance.   HENT:      Head: Normocephalic.      Ears:      Comments: TMs bulging clear fluid     Nose:      Comments: Turbinates red     Mouth/Throat:      Comments: Heavy postnasal drip  Cardiovascular:      Rate and Rhythm: Normal rate and regular rhythm.      Pulses: Normal pulses.      Heart sounds: Normal  heart sounds.   Pulmonary:      Effort: Pulmonary effort is normal.      Breath sounds: Normal breath sounds.   Abdominal:      General: Bowel sounds are normal.   Musculoskeletal:         General: Normal range of motion.   Skin:     General: Skin is warm.   Neurological:      General: No focal deficit present.      Mental Status: She is alert and oriented to person, place, and time.   Psychiatric:         Mood and Affect: Mood normal.         Behavior: Behavior normal.       Procedures    Assessment & Plan   Diagnoses and all orders for this visit:    1. Non-seasonal allergic rhinitis due to pollen (Primary)    2. Acute non-recurrent maxillary sinusitis    Other orders  -     cetirizine (ZyrTEC Allergy) 10 MG tablet; Take 1 tablet by mouth Daily.  Dispense: 30 tablet; Refill: 2  -     fluticasone (FLONASE) 50 MCG/ACT nasal spray; 2 sprays into the nostril(s) as directed by provider Daily.  Dispense: 16 g; Refill: 5  -     pseudoephedrine (Sudafed) 30 MG tablet; Take 1 tablet by mouth Every 4 (Four) Hours As Needed for Congestion.  Dispense: 60 tablet; Refill: 2  -     azithromycin (Zithromax Z-Omer) 250 MG tablet; Take 2 tablets the first day, then 1 tablet daily for 4 days.  Dispense: 6 tablet; Refill: 0           Current Outpatient Medications:     albuterol sulfate HFA (Ventolin HFA) 108 (90 Base) MCG/ACT inhaler, Inhale 2 puffs Every 4 (Four) Hours As Needed for Wheezing., Disp: 18.2 g, Rfl: 6    brimonidine (ALPHAGAN) 0.2 % ophthalmic solution, Administer 1 drop to both eyes 2 (Two) Times a Day., Disp: , Rfl:     buPROPion XL (WELLBUTRIN XL) 300 MG 24 hr tablet, Take 1 tablet by mouth Daily., Disp: 90 tablet, Rfl: 1    busPIRone (BUSPAR) 5 MG tablet, Take 1 tablet by mouth 3 (Three) Times a Day., Disp: 90 tablet, Rfl: 0    carbamide peroxide (Debrox) 6.5 % otic solution, Administer 5 drops into the left ear 2 (Two) Times a Day., Disp: 15 mL, Rfl: 2    Cholecalciferol 25 MCG (1000 UT) capsule, Take  by mouth.,  Disp: , Rfl:     cyclobenzaprine (FLEXERIL) 5 MG tablet, Take 1 tablet by mouth 3 (Three) Times a Day As Needed for Muscle Spasms., Disp: 15 tablet, Rfl: 0    esomeprazole (nexIUM) 20 MG capsule, Take 1 capsule by mouth Every Morning Before Breakfast., Disp: 30 capsule, Rfl: 1    fluticasone (FLONASE) 50 MCG/ACT nasal spray, 2 sprays into the nostril(s) as directed by provider Daily., Disp: 16 g, Rfl: 5    ibuprofen (ADVIL,MOTRIN) 800 MG tablet, Take 1 tablet by mouth Every 8 (Eight) Hours As Needed for Mild Pain ., Disp: 15 tablet, Rfl: 0    meclizine (ANTIVERT) 12.5 MG tablet, 1-2 tabs q8h prn, Disp: 30 tablet, Rfl: 2    ondansetron (Zofran) 4 MG tablet, Take 1 tablet by mouth Every 8 (Eight) Hours As Needed for Nausea or Vomiting., Disp: 30 tablet, Rfl: 2    pravastatin (PRAVACHOL) 10 MG tablet, Take 1 tablet by mouth Daily., Disp: 30 tablet, Rfl: 2    azithromycin (Zithromax Z-Omer) 250 MG tablet, Take 2 tablets the first day, then 1 tablet daily for 4 days., Disp: 6 tablet, Rfl: 0    cetirizine (ZyrTEC Allergy) 10 MG tablet, Take 1 tablet by mouth Daily., Disp: 30 tablet, Rfl: 2    pseudoephedrine (Sudafed) 30 MG tablet, Take 1 tablet by mouth Every 4 (Four) Hours As Needed for Congestion., Disp: 60 tablet, Rfl: 2           ISELA Cooper 8/21/2023 10:03 EDT  This note has been electronically signed

## 2023-08-21 NOTE — PATIENT INSTRUCTIONS
Z-Omer  Zyrtec/Sudafed/Flonase  Wear mask when going outside  Follow-up as needed   Clindamycin Counseling: I counseled the patient regarding use of clindamycin as an antibiotic for prophylactic and/or therapeutic purposes. Clindamycin is active against numerous classes of bacteria, including skin bacteria. Side effects may include nausea, diarrhea, gastrointestinal upset, rash, hives, yeast infections, and in rare cases, colitis.

## 2023-08-22 LAB
O+P SPEC MICRO: NORMAL
O+P STL CONC: NORMAL

## 2023-10-11 ENCOUNTER — TELEPHONE (OUTPATIENT)
Dept: FAMILY MEDICINE CLINIC | Facility: CLINIC | Age: 58
End: 2023-10-11
Payer: MEDICARE

## 2023-10-11 NOTE — TELEPHONE ENCOUNTER
Caller: Celine Strong    Relationship to patient: Self    Best call back number: [phone]    Chief complaint: FATIGUE, COUGHING WITH PHLEGM, CONGESTION, SOME SHORTNESS OF BREATHE, SNEEZING     Type of visit: SAME DAY    Requested date: WOULD NEED AN EARLY MORNING     If rescheduling, when is the original appointment: N/A     Additional notes:PATIENT CALLED AND SAID THAT SHE WOULD LIKE TO BE SEEN IF POSSIBLE, [facility] DID NOT SEE ANY AVAILABLE OPENINGS    REQUESTED CALLBACK

## 2024-01-13 ENCOUNTER — HOSPITAL ENCOUNTER (EMERGENCY)
Facility: HOSPITAL | Age: 59
Discharge: HOME | End: 2024-01-13
Attending: EMERGENCY MEDICINE
Payer: MEDICARE

## 2024-01-13 VITALS
TEMPERATURE: 97.7 F | SYSTOLIC BLOOD PRESSURE: 124 MMHG | OXYGEN SATURATION: 100 % | BODY MASS INDEX: 20.73 KG/M2 | WEIGHT: 129 LBS | HEIGHT: 66 IN | RESPIRATION RATE: 18 BRPM | HEART RATE: 72 BPM | DIASTOLIC BLOOD PRESSURE: 63 MMHG

## 2024-01-13 DIAGNOSIS — N39.0 URINARY TRACT INFECTION WITHOUT HEMATURIA, SITE UNSPECIFIED: Primary | ICD-10-CM

## 2024-01-13 LAB
BILIRUB UR QL STRIP: NEGATIVE
CLARITY UR: ABNORMAL
COLOR UR: YELLOW
GLUCOSE UR STRIP-MCNC: NEGATIVE MG/DL
HGB UR QL STRIP.AUTO: ABNORMAL
KETONES UR QL STRIP: NEGATIVE
LEUKOCYTE ESTERASE UR QL STRIP.AUTO: ABNORMAL
NITRITE UR QL STRIP: NEGATIVE
PH UR STRIP.AUTO: 5.5 [PH] (ref 5–8)
PROT UR QL STRIP: NEGATIVE
SP GR UR STRIP: 1.02 (ref 1–1.03)
UROBILINOGEN UR QL STRIP: ABNORMAL

## 2024-01-13 PROCEDURE — 99283 EMERGENCY DEPT VISIT LOW MDM: CPT

## 2024-01-13 PROCEDURE — 81003 URINALYSIS AUTO W/O SCOPE: CPT | Performed by: EMERGENCY MEDICINE

## 2024-01-13 RX ORDER — PHENAZOPYRIDINE HYDROCHLORIDE 200 MG/1
200 TABLET, FILM COATED ORAL ONCE
Status: COMPLETED | OUTPATIENT
Start: 2024-01-13 | End: 2024-01-13

## 2024-01-13 RX ORDER — PHENAZOPYRIDINE HYDROCHLORIDE 200 MG/1
200 TABLET, FILM COATED ORAL 3 TIMES DAILY PRN
Qty: 6 TABLET | Refills: 0 | Status: SHIPPED | OUTPATIENT
Start: 2024-01-13

## 2024-01-13 RX ORDER — CEPHALEXIN 500 MG/1
500 CAPSULE ORAL 2 TIMES DAILY
Qty: 14 CAPSULE | Refills: 0 | Status: SHIPPED | OUTPATIENT
Start: 2024-01-13 | End: 2024-01-20

## 2024-01-13 RX ORDER — CEPHALEXIN 500 MG/1
500 CAPSULE ORAL ONCE
Status: COMPLETED | OUTPATIENT
Start: 2024-01-13 | End: 2024-01-13

## 2024-01-13 RX ADMIN — PHENAZOPYRIDINE HYDROCHLORIDE 200 MG: 200 TABLET ORAL at 23:27

## 2024-01-13 RX ADMIN — CEPHALEXIN 500 MG: 500 CAPSULE ORAL at 23:27

## 2024-01-14 NOTE — DISCHARGE INSTRUCTIONS
Drink plenty of fluids. Take medication as prescribed. Follow-up with your Doctor. Return if problems.

## 2024-01-14 NOTE — ED NOTES
Patient complained of pelvic pain, right flank pain, and her whole right back. States she usually gets UTI. Nurse asked patient to change to a gown but refuses static she's cold and prefers her clothes and coat on her and just wants to wait what the doctor says.

## 2024-01-14 NOTE — FSED PROVIDER NOTE
Subjective   History of Present Illness  58 yof complains of dysuria, urgency, frequency and low back pain. She denies fever, chills, nausea or vomiting. She reports it feels like she is getting a UTI.       Review of Systems   Constitutional: Negative.    Gastrointestinal: Negative.    Genitourinary:  Positive for dysuria and urgency.   Musculoskeletal:  Positive for back pain.   All other systems reviewed and are negative.      Past Medical History:   Diagnosis Date    Depression     GERD (gastroesophageal reflux disease)     Hyperlipidemia     Multiple sclerosis        Allergies   Allergen Reactions    Lipitor [Atorvastatin Calcium] Palpitations       Past Surgical History:   Procedure Laterality Date    EYE SURGERY      KNEE ARTHROSCOPY Left        Family History   Problem Relation Age of Onset    Hyperlipidemia Mother     Hyperlipidemia Father     Heart disease Father     Leukemia Father     No Known Problems Son     No Known Problems Half-Sister     No Known Problems Half-Sister     No Known Problems Son        Social History     Socioeconomic History    Marital status: Unknown   Tobacco Use    Smoking status: Former     Packs/day: 0.25     Years: 4.00     Additional pack years: 0.00     Total pack years: 1.00     Types: Cigarettes     Start date:      Quit date: 1989     Years since quittin.0     Passive exposure: Past    Smokeless tobacco: Never   Vaping Use    Vaping Use: Never used   Substance and Sexual Activity    Alcohol use: Never    Drug use: Never    Sexual activity: Defer           Objective   Physical Exam  Vitals reviewed.   Constitutional:       Appearance: She is well-developed.   HENT:      Head: Normocephalic and atraumatic.      Mouth/Throat:      Mouth: Mucous membranes are moist.      Pharynx: Oropharynx is clear.   Eyes:      Extraocular Movements: Extraocular movements intact.      Pupils: Pupils are equal, round, and reactive to light.   Cardiovascular:      Rate and Rhythm:  Normal rate and regular rhythm.      Heart sounds: Normal heart sounds.   Pulmonary:      Effort: Pulmonary effort is normal.      Breath sounds: Normal breath sounds.   Abdominal:      General: Abdomen is flat. Bowel sounds are normal.      Palpations: Abdomen is soft.      Tenderness: There is no abdominal tenderness. There is no right CVA tenderness or left CVA tenderness.   Skin:     General: Skin is warm and dry.      Capillary Refill: Capillary refill takes less than 2 seconds.   Neurological:      Mental Status: She is alert.         Procedures           ED Course                                           Medical Decision Making  This patient presents with symptoms consistent with acute uncomplicated cystitis. No systemic symptoms. Not septic. Well appearing. Low suspicion for acute pyelonephritis given lack of fever, CVAT, or systemic features. Low suspicion for kidney stone or infected stone.  Low suspicion for ovarian torsion, PID, or appendicitis. Rx Keflex Return precautions discussed.     Problems Addressed:  Urinary tract infection without hematuria, site unspecified: complicated acute illness or injury    Risk  Prescription drug management.        Final diagnoses:   Urinary tract infection without hematuria, site unspecified       ED Disposition  ED Disposition       ED Disposition   Discharge    Condition   Stable    Comment   --               Chantale Julien, APRN  [address] [address] [phone]    Schedule an appointment as soon as possible for a visit on 1/17/2024           Medication List        New Prescriptions      cephalexin 500 MG capsule  Commonly known as: KEFLEX  Take 1 capsule by mouth 2 (Two) Times a Day for 7 days.     phenazopyridine 200 MG tablet  Commonly known as: PYRIDIUM  Take 1 tablet by mouth 3 (Three) Times a Day As Needed for Dysuria.               Where to Get Your Medications        These medications were sent to [facility] [address] [place] [address] [place]  - [address] - [phone]  - [phone] FX  [address] [place] [address]      Phone: [phone]   cephalexin 500 MG capsule  phenazopyridine 200 MG tablet

## 2024-03-16 ENCOUNTER — HOSPITAL ENCOUNTER (OUTPATIENT)
Facility: HOSPITAL | Age: 59
Setting detail: OBSERVATION
Discharge: HOME | End: 2024-03-18
Attending: EMERGENCY MEDICINE | Admitting: EMERGENCY MEDICINE
Payer: MEDICARE

## 2024-03-16 DIAGNOSIS — R07.9 CHEST PAIN, UNSPECIFIED TYPE: Primary | ICD-10-CM

## 2024-03-16 PROCEDURE — 36415 COLL VENOUS BLD VENIPUNCTURE: CPT

## 2024-03-16 PROCEDURE — 85025 COMPLETE CBC W/AUTO DIFF WBC: CPT | Performed by: NURSE PRACTITIONER

## 2024-03-16 PROCEDURE — 84484 ASSAY OF TROPONIN QUANT: CPT | Performed by: NURSE PRACTITIONER

## 2024-03-16 PROCEDURE — 93005 ELECTROCARDIOGRAM TRACING: CPT | Performed by: EMERGENCY MEDICINE

## 2024-03-16 PROCEDURE — 80053 COMPREHEN METABOLIC PANEL: CPT | Performed by: NURSE PRACTITIONER

## 2024-03-16 PROCEDURE — 83880 ASSAY OF NATRIURETIC PEPTIDE: CPT | Performed by: NURSE PRACTITIONER

## 2024-03-16 PROCEDURE — 99285 EMERGENCY DEPT VISIT HI MDM: CPT

## 2024-03-16 PROCEDURE — 93005 ELECTROCARDIOGRAM TRACING: CPT

## 2024-03-16 RX ORDER — SODIUM CHLORIDE 0.9 % (FLUSH) 0.9 %
10 SYRINGE (ML) INJECTION AS NEEDED
Status: DISCONTINUED | OUTPATIENT
Start: 2024-03-16 | End: 2024-03-18 | Stop reason: HOSPADM

## 2024-03-17 ENCOUNTER — APPOINTMENT (OUTPATIENT)
Dept: NUCLEAR MEDICINE | Facility: HOSPITAL | Age: 59
End: 2024-03-17
Payer: MEDICARE

## 2024-03-17 ENCOUNTER — APPOINTMENT (OUTPATIENT)
Dept: CT IMAGING | Facility: HOSPITAL | Age: 59
End: 2024-03-17
Payer: MEDICARE

## 2024-03-17 ENCOUNTER — APPOINTMENT (OUTPATIENT)
Dept: GENERAL RADIOLOGY | Facility: HOSPITAL | Age: 59
End: 2024-03-17
Payer: MEDICARE

## 2024-03-17 PROBLEM — R07.9 CHEST PAIN: Status: ACTIVE | Noted: 2024-03-17

## 2024-03-17 LAB
ALBUMIN SERPL-MCNC: 4.5 G/DL (ref 3.5–5.2)
ALBUMIN/GLOB SERPL: 1.9 G/DL
ALP SERPL-CCNC: 64 U/L (ref 39–117)
ALT SERPL W P-5'-P-CCNC: 11 U/L (ref 1–33)
ANION GAP SERPL CALCULATED.3IONS-SCNC: 11 MMOL/L (ref 5–15)
AST SERPL-CCNC: 13 U/L (ref 1–32)
BASOPHILS # BLD AUTO: 0.04 10*3/MM3 (ref 0–0.2)
BASOPHILS NFR BLD AUTO: 0.7 % (ref 0–1.5)
BH CV REST NUCLEAR ISOTOPE DOSE: 10.3 MCI
BH CV STRESS BP STAGE 1: NORMAL
BH CV STRESS BP STAGE 2: NORMAL
BH CV STRESS DOSE REGADENOSON STAGE 1: 0.4
BH CV STRESS DURATION MIN STAGE 1: 0
BH CV STRESS DURATION SEC STAGE 1: 10
BH CV STRESS DURATION SEC STAGE 2: 0
BH CV STRESS HR STAGE 1: 100
BH CV STRESS HR STAGE 2: 114
BH CV STRESS NUCLEAR ISOTOPE DOSE: 29.2 MCI
BH CV STRESS PROTOCOL 1: NORMAL
BH CV STRESS RECOVERY BP: NORMAL MMHG
BH CV STRESS RECOVERY HR: 91 BPM
BH CV STRESS STAGE 1: 1
BH CV STRESS STAGE 2: 2
BILIRUB SERPL-MCNC: 0.2 MG/DL (ref 0–1.2)
BUN SERPL-MCNC: 19 MG/DL (ref 6–20)
BUN/CREAT SERPL: 24.4 (ref 7–25)
CALCIUM SPEC-SCNC: 9.2 MG/DL (ref 8.6–10.5)
CHLORIDE SERPL-SCNC: 104 MMOL/L (ref 98–107)
CHOLEST SERPL-MCNC: 205 MG/DL (ref 0–200)
CO2 SERPL-SCNC: 26 MMOL/L (ref 22–29)
CREAT SERPL-MCNC: 0.78 MG/DL (ref 0.57–1)
D DIMER PPP FEU-MCNC: 0.82 MG/L (FEU) (ref 0–0.59)
DEPRECATED RDW RBC AUTO: 44.2 FL (ref 37–54)
EGFRCR SERPLBLD CKD-EPI 2021: 87.6 ML/MIN/1.73
EOSINOPHIL # BLD AUTO: 0.13 10*3/MM3 (ref 0–0.4)
EOSINOPHIL NFR BLD AUTO: 2.2 % (ref 0.3–6.2)
ERYTHROCYTE [DISTWIDTH] IN BLOOD BY AUTOMATED COUNT: 13.2 % (ref 12.3–15.4)
GEN 5 2HR TROPONIN T REFLEX: <6 NG/L
GLOBULIN UR ELPH-MCNC: 2.4 GM/DL
GLUCOSE SERPL-MCNC: 100 MG/DL (ref 65–99)
HCT VFR BLD AUTO: 38.8 % (ref 34–46.6)
HDLC SERPL-MCNC: 59 MG/DL (ref 40–60)
HGB BLD-MCNC: 12.7 G/DL (ref 12–15.9)
HOLD SPECIMEN: NORMAL
HOLD SPECIMEN: NORMAL
IMM GRANULOCYTES # BLD AUTO: 0.01 10*3/MM3 (ref 0–0.05)
IMM GRANULOCYTES NFR BLD AUTO: 0.2 % (ref 0–0.5)
LDLC SERPL CALC-MCNC: 130 MG/DL (ref 0–100)
LDLC/HDLC SERPL: 2.17 {RATIO}
LV EF NUC BP: 77 %
LYMPHOCYTES # BLD AUTO: 2.16 10*3/MM3 (ref 0.7–3.1)
LYMPHOCYTES NFR BLD AUTO: 36.8 % (ref 19.6–45.3)
MAGNESIUM SERPL-MCNC: 2 MG/DL (ref 1.6–2.6)
MAXIMAL PREDICTED HEART RATE: 161 BPM
MCH RBC QN AUTO: 30 PG (ref 26.6–33)
MCHC RBC AUTO-ENTMCNC: 32.7 G/DL (ref 31.5–35.7)
MCV RBC AUTO: 91.7 FL (ref 79–97)
MONOCYTES # BLD AUTO: 0.58 10*3/MM3 (ref 0.1–0.9)
MONOCYTES NFR BLD AUTO: 9.9 % (ref 5–12)
NEUTROPHILS NFR BLD AUTO: 2.95 10*3/MM3 (ref 1.7–7)
NEUTROPHILS NFR BLD AUTO: 50.2 % (ref 42.7–76)
NRBC BLD AUTO-RTO: 0 /100 WBC (ref 0–0.2)
NT-PROBNP SERPL-MCNC: 98.7 PG/ML (ref 0–900)
PERCENT MAX PREDICTED HR: 70.81 %
PLATELET # BLD AUTO: 210 10*3/MM3 (ref 140–450)
PMV BLD AUTO: 10.6 FL (ref 6–12)
POTASSIUM SERPL-SCNC: 4.2 MMOL/L (ref 3.5–5.2)
PROT SERPL-MCNC: 6.9 G/DL (ref 6–8.5)
QT INTERVAL: 394 MS
QTC INTERVAL: 400 MS
RBC # BLD AUTO: 4.23 10*6/MM3 (ref 3.77–5.28)
SODIUM SERPL-SCNC: 141 MMOL/L (ref 136–145)
STRESS BASELINE BP: NORMAL MMHG
STRESS BASELINE HR: 72 BPM
STRESS PERCENT HR: 83 %
STRESS POST PEAK BP: NORMAL MMHG
STRESS POST PEAK HR: 114 BPM
STRESS TARGET HR: 137 BPM
T4 FREE SERPL-MCNC: 1.18 NG/DL (ref 0.93–1.7)
TRIGL SERPL-MCNC: 89 MG/DL (ref 0–150)
TROPONIN T DELTA: NORMAL
TROPONIN T SERPL HS-MCNC: <6 NG/L
TSH SERPL DL<=0.05 MIU/L-ACNC: 1.53 UIU/ML (ref 0.27–4.2)
VLDLC SERPL-MCNC: 16 MG/DL (ref 5–40)
WBC NRBC COR # BLD AUTO: 5.87 10*3/MM3 (ref 3.4–10.8)
WHOLE BLOOD HOLD COAG: NORMAL
WHOLE BLOOD HOLD SPECIMEN: NORMAL

## 2024-03-17 PROCEDURE — 78452 HT MUSCLE IMAGE SPECT MULT: CPT | Performed by: STUDENT IN AN ORGANIZED HEALTH CARE EDUCATION/TRAINING PROGRAM

## 2024-03-17 PROCEDURE — 85379 FIBRIN DEGRADATION QUANT: CPT | Performed by: NURSE PRACTITIONER

## 2024-03-17 PROCEDURE — 84484 ASSAY OF TROPONIN QUANT: CPT | Performed by: NURSE PRACTITIONER

## 2024-03-17 PROCEDURE — 93018 CV STRESS TEST I&R ONLY: CPT | Performed by: STUDENT IN AN ORGANIZED HEALTH CARE EDUCATION/TRAINING PROGRAM

## 2024-03-17 PROCEDURE — 25010000002 REGADENOSON 0.4 MG/5ML SOLUTION: Performed by: EMERGENCY MEDICINE

## 2024-03-17 PROCEDURE — 78452 HT MUSCLE IMAGE SPECT MULT: CPT

## 2024-03-17 PROCEDURE — G0378 HOSPITAL OBSERVATION PER HR: HCPCS

## 2024-03-17 PROCEDURE — 93017 CV STRESS TEST TRACING ONLY: CPT

## 2024-03-17 PROCEDURE — 84439 ASSAY OF FREE THYROXINE: CPT | Performed by: NURSE PRACTITIONER

## 2024-03-17 PROCEDURE — 71045 X-RAY EXAM CHEST 1 VIEW: CPT

## 2024-03-17 PROCEDURE — 84443 ASSAY THYROID STIM HORMONE: CPT | Performed by: NURSE PRACTITIONER

## 2024-03-17 PROCEDURE — 80061 LIPID PANEL: CPT | Performed by: NURSE PRACTITIONER

## 2024-03-17 PROCEDURE — 25510000001 IOPAMIDOL PER 1 ML: Performed by: EMERGENCY MEDICINE

## 2024-03-17 PROCEDURE — 71275 CT ANGIOGRAPHY CHEST: CPT

## 2024-03-17 PROCEDURE — 83735 ASSAY OF MAGNESIUM: CPT | Performed by: NURSE PRACTITIONER

## 2024-03-17 PROCEDURE — 36415 COLL VENOUS BLD VENIPUNCTURE: CPT

## 2024-03-17 PROCEDURE — 93016 CV STRESS TEST SUPVJ ONLY: CPT | Performed by: NURSE PRACTITIONER

## 2024-03-17 PROCEDURE — A9502 TC99M TETROFOSMIN: HCPCS | Performed by: EMERGENCY MEDICINE

## 2024-03-17 PROCEDURE — 0 TECHNETIUM TETROFOSMIN KIT: Performed by: EMERGENCY MEDICINE

## 2024-03-17 RX ORDER — BISACODYL 10 MG
10 SUPPOSITORY, RECTAL RECTAL DAILY PRN
Status: DISCONTINUED | OUTPATIENT
Start: 2024-03-17 | End: 2024-03-18 | Stop reason: HOSPADM

## 2024-03-17 RX ORDER — ONDANSETRON 2 MG/ML
4 INJECTION INTRAMUSCULAR; INTRAVENOUS EVERY 6 HOURS PRN
Status: DISCONTINUED | OUTPATIENT
Start: 2024-03-17 | End: 2024-03-18 | Stop reason: HOSPADM

## 2024-03-17 RX ORDER — CHOLECALCIFEROL (VITAMIN D3) 125 MCG
5 CAPSULE ORAL NIGHTLY PRN
Status: DISCONTINUED | OUTPATIENT
Start: 2024-03-17 | End: 2024-03-18 | Stop reason: HOSPADM

## 2024-03-17 RX ORDER — NITROGLYCERIN 0.4 MG/1
0.4 TABLET SUBLINGUAL
Status: DISCONTINUED | OUTPATIENT
Start: 2024-03-17 | End: 2024-03-17 | Stop reason: SDUPTHER

## 2024-03-17 RX ORDER — BISACODYL 5 MG/1
5 TABLET, DELAYED RELEASE ORAL DAILY PRN
Status: DISCONTINUED | OUTPATIENT
Start: 2024-03-17 | End: 2024-03-17 | Stop reason: SDUPTHER

## 2024-03-17 RX ORDER — ASPIRIN 81 MG/1
324 TABLET, CHEWABLE ORAL ONCE
Status: COMPLETED | OUTPATIENT
Start: 2024-03-17 | End: 2024-03-17

## 2024-03-17 RX ORDER — BUPROPION HYDROCHLORIDE 150 MG/1
300 TABLET ORAL DAILY
Status: DISCONTINUED | OUTPATIENT
Start: 2024-03-17 | End: 2024-03-18 | Stop reason: HOSPADM

## 2024-03-17 RX ORDER — AMOXICILLIN 250 MG
2 CAPSULE ORAL 2 TIMES DAILY
Status: DISCONTINUED | OUTPATIENT
Start: 2024-03-17 | End: 2024-03-18 | Stop reason: HOSPADM

## 2024-03-17 RX ORDER — BISACODYL 5 MG/1
5 TABLET, DELAYED RELEASE ORAL DAILY PRN
Status: DISCONTINUED | OUTPATIENT
Start: 2024-03-17 | End: 2024-03-18 | Stop reason: HOSPADM

## 2024-03-17 RX ORDER — MORPHINE SULFATE 2 MG/ML
1 INJECTION, SOLUTION INTRAMUSCULAR; INTRAVENOUS EVERY 4 HOURS PRN
Status: DISCONTINUED | OUTPATIENT
Start: 2024-03-17 | End: 2024-03-18 | Stop reason: HOSPADM

## 2024-03-17 RX ORDER — CETIRIZINE HYDROCHLORIDE 10 MG/1
10 TABLET ORAL DAILY
Status: DISCONTINUED | OUTPATIENT
Start: 2024-03-17 | End: 2024-03-18 | Stop reason: HOSPADM

## 2024-03-17 RX ORDER — NITROGLYCERIN 0.4 MG/1
0.4 TABLET SUBLINGUAL
Status: DISCONTINUED | OUTPATIENT
Start: 2024-03-17 | End: 2024-03-18 | Stop reason: HOSPADM

## 2024-03-17 RX ORDER — REGADENOSON 0.08 MG/ML
0.4 INJECTION, SOLUTION INTRAVENOUS
Status: COMPLETED | OUTPATIENT
Start: 2024-03-17 | End: 2024-03-17

## 2024-03-17 RX ORDER — ALPRAZOLAM 0.25 MG/1
0.25 TABLET ORAL 3 TIMES DAILY PRN
Status: DISCONTINUED | OUTPATIENT
Start: 2024-03-17 | End: 2024-03-18 | Stop reason: HOSPADM

## 2024-03-17 RX ORDER — BISACODYL 10 MG
10 SUPPOSITORY, RECTAL RECTAL DAILY PRN
Status: DISCONTINUED | OUTPATIENT
Start: 2024-03-17 | End: 2024-03-17 | Stop reason: SDUPTHER

## 2024-03-17 RX ORDER — POLYETHYLENE GLYCOL 3350 17 G/17G
17 POWDER, FOR SOLUTION ORAL DAILY PRN
Status: DISCONTINUED | OUTPATIENT
Start: 2024-03-17 | End: 2024-03-18 | Stop reason: HOSPADM

## 2024-03-17 RX ORDER — ACETAMINOPHEN 325 MG/1
650 TABLET ORAL EVERY 4 HOURS PRN
Status: DISCONTINUED | OUTPATIENT
Start: 2024-03-17 | End: 2024-03-18 | Stop reason: HOSPADM

## 2024-03-17 RX ORDER — NALOXONE HCL 0.4 MG/ML
0.4 VIAL (ML) INJECTION
Status: DISCONTINUED | OUTPATIENT
Start: 2024-03-17 | End: 2024-03-18 | Stop reason: HOSPADM

## 2024-03-17 RX ORDER — LORATADINE 10 MG/1
10 TABLET ORAL DAILY PRN
COMMUNITY

## 2024-03-17 RX ORDER — POLYETHYLENE GLYCOL 3350 17 G/17G
17 POWDER, FOR SOLUTION ORAL DAILY PRN
Status: DISCONTINUED | OUTPATIENT
Start: 2024-03-17 | End: 2024-03-17 | Stop reason: SDUPTHER

## 2024-03-17 RX ORDER — AMOXICILLIN 250 MG
2 CAPSULE ORAL 2 TIMES DAILY PRN
Status: DISCONTINUED | OUTPATIENT
Start: 2024-03-17 | End: 2024-03-17 | Stop reason: SDUPTHER

## 2024-03-17 RX ADMIN — IOPAMIDOL 100 ML: 755 INJECTION, SOLUTION INTRAVENOUS at 15:57

## 2024-03-17 RX ADMIN — ASPIRIN 81 MG CHEWABLE TABLET 324 MG: 81 TABLET CHEWABLE at 01:58

## 2024-03-17 RX ADMIN — DOCUSATE SODIUM AND SENNOSIDES 2 TABLET: 8.6; 5 TABLET, FILM COATED ORAL at 13:52

## 2024-03-17 RX ADMIN — TETROFOSMIN 1 DOSE: 1.38 INJECTION, POWDER, LYOPHILIZED, FOR SOLUTION INTRAVENOUS at 08:50

## 2024-03-17 RX ADMIN — CETIRIZINE HYDROCHLORIDE 10 MG: 10 TABLET, FILM COATED ORAL at 13:52

## 2024-03-17 RX ADMIN — REGADENOSON 0.4 MG: 0.08 INJECTION, SOLUTION INTRAVENOUS at 10:16

## 2024-03-17 RX ADMIN — TETROFOSMIN 1 DOSE: 1.38 INJECTION, POWDER, LYOPHILIZED, FOR SOLUTION INTRAVENOUS at 10:17

## 2024-03-17 RX ADMIN — BUPROPION HYDROCHLORIDE 300 MG: 150 TABLET, EXTENDED RELEASE ORAL at 13:52

## 2024-03-17 NOTE — PLAN OF CARE
Problem: Adult Inpatient Plan of Care  Goal: Absence of Hospital-Acquired Illness or Injury  Intervention: Identify and Manage Fall Risk  Recent Flowsheet Documentation  Taken 3/17/2024 0400 by Wilma Boyd, RN  Safety Promotion/Fall Prevention:   nonskid shoes/slippers when out of bed   safety round/check completed   lighting adjusted  Intervention: Prevent and Manage VTE (Venous Thromboembolism) Risk  Recent Flowsheet Documentation  Taken 3/17/2024 0400 by Wilma Boyd, RN  Activity Management: bedrest  Goal: Readiness for Transition of Care  Intervention: Mutually Develop Transition Plan  Recent Flowsheet Documentation  Taken 3/17/2024 0457 by Wilma Boyd, RN  Transportation Anticipated: family or friend will provide  Patient/Family Anticipated Services at Transition: none  Patient/Family Anticipates Transition to: home with family  Taken 3/17/2024 0455 by Wilma Boyd, RN  Equipment Currently Used at Home: none   Goal Outcome Evaluation:

## 2024-03-17 NOTE — H&P
[facility] H&P    Patient Name: Celine Musa  : 1965  MRN: [id number]  Primary Care Physician: Chantale Julien APRN  Date of admission: 3/16/2024     Patient Care Team:  Chantale Julien APRN as PCP - General (Nurse Practitioner)          Subjective   History Present Illness     Chief Complaint:   Chief Complaint   Patient presents with    Chest Pain     Pt reports midsternal CP that radiates to L arm that she states started Thursday.  Pt reports a constant, dull throbbing pain.  Pt reports ASA seems to temporarily relieve it.  Pt reports she was sent from [facility] in [place].      Chest pain    Chest Pain       History of Present Illness  Patient is 59-year-old female history multiple sclerosis, presents to the ED with complaint of chest pain, symptoms radiates into the left arm and into her neck.  She describes it as a constant dull throbbing pain.  She reports her pain began on Thursday.  It is not exertional.  She is not having any episodes of syncope or diaphoresis.  She does report that she has visual changes, but she does have this with her MS at times.  Reports she has been more fatigued than normal.  No headache or thunderclap headache.  No new numbness tingling or weakness.     Observation 2024  Patient agrees with HPI noted above including chest pain radiating to left shoulder/left arm area for the past 2 days.  She denies any pain currently but states that she is mostly concerned with intermittent bilateral leg pain behind her calf and shooting up her tight.  She is very concerned with blood clots as she reports a family history of PE and DVT.    Review of Systems   Cardiovascular:  Positive for chest pain.     Constitutional:  Positive for fatigue. Negative for chills, diaphoresis and fever.   Eyes:  Positive for photophobia. Negative for visual disturbance.   Respiratory:  Negative for shortness of breath.    Cardiovascular:  Positive for chest pain.  Negative for palpitations and leg swelling.   Gastrointestinal:  Negative for abdominal pain.   Musculoskeletal:  Negative for back pain and neck pain.   Skin:  Negative for color change and rash.   Neurological:  Negative for dizziness, tremors, seizures, syncope, facial asymmetry, speech difficulty, weakness, light-headedness, numbness and headaches.       Personal History     Past Medical History:   Past Medical History:   Diagnosis Date    Depression     GERD (gastroesophageal reflux disease)     Hyperlipidemia     Multiple sclerosis        Surgical History:      Past Surgical History:   Procedure Laterality Date    EYE SURGERY      KNEE ARTHROSCOPY Left            Family History: family history includes Heart disease in her father; Hyperlipidemia in her father and mother; Leukemia in her father; No Known Problems in her half-sister, half-sister, son, and son. Otherwise pertinent FHx was reviewed and unremarkable.     Social History:  reports that she quit smoking about 35 years ago. Her smoking use included cigarettes. She started smoking about 39 years ago. She has a 1 pack-year smoking history. She has been exposed to tobacco smoke. She has never used smokeless tobacco. She reports that she does not drink alcohol and does not use drugs.      Medications:  Prior to Admission medications    Medication Sig Start Date End Date Taking? Authorizing Provider   buPROPion XL (WELLBUTRIN XL) 300 MG 24 hr tablet Take 1 tablet by mouth Daily. 2/10/23  Yes Kate Bower APRN   loratadine (CLARITIN) 10 MG tablet Take 1 tablet by mouth Daily As Needed for Allergies.   Yes Provider, MD Alisa   albuterol sulfate HFA (Ventolin HFA) 108 (90 Base) MCG/ACT inhaler Inhale 2 puffs Every 4 (Four) Hours As Needed for Wheezing. 1/31/22 3/17/24  Chantale Julien APRN   azithromycin (Zithromax Z-Omer) 250 MG tablet Take 2 tablets the first day, then 1 tablet daily for 4 days. 8/21/23 3/17/24  Chantale Julien APRN    brimonidine (ALPHAGAN) 0.2 % ophthalmic solution Administer 1 drop to both eyes 2 (Two) Times a Day. 5/17/23 3/17/24  ProviderAlisa MD   busPIRone (BUSPAR) 5 MG tablet Take 1 tablet by mouth 3 (Three) Times a Day. 3/7/23 3/17/24  Kate Bower APRN   carbamide peroxide (Debrox) 6.5 % otic solution Administer 5 drops into the left ear 2 (Two) Times a Day. 5/25/23 3/17/24  Chantale Julien APRN   cetirizine (ZyrTEC Allergy) 10 MG tablet Take 1 tablet by mouth Daily. 8/21/23 3/17/24  Chantale Julien APRN   Cholecalciferol 25 MCG (1000 UT) capsule Take  by mouth.  3/17/24  Provider, MD Alisa   cyclobenzaprine (FLEXERIL) 5 MG tablet Take 1 tablet by mouth 3 (Three) Times a Day As Needed for Muscle Spasms. 8/4/23 3/17/24  Kate Bower APRN   esomeprazole (nexIUM) 20 MG capsule Take 1 capsule by mouth Every Morning Before Breakfast. 8/4/23 3/17/24  Kate Bower APRN   fluticasone (FLONASE) 50 MCG/ACT nasal spray 2 sprays into the nostril(s) as directed by provider Daily. 8/21/23 3/17/24  Chantale Julien APRN   ibuprofen (ADVIL,MOTRIN) 800 MG tablet Take 1 tablet by mouth Every 8 (Eight) Hours As Needed for Mild Pain . 5/16/22 3/17/24  Chantale Julien APRN   meclizine (ANTIVERT) 12.5 MG tablet 1-2 tabs q8h prn 1/20/22 3/17/24  Chantale Julien APRN   ondansetron (Zofran) 4 MG tablet Take 1 tablet by mouth Every 8 (Eight) Hours As Needed for Nausea or Vomiting. 1/31/22 3/17/24  Chantale Julien APRN   phenazopyridine (PYRIDIUM) 200 MG tablet Take 1 tablet by mouth 3 (Three) Times a Day As Needed for Dysuria. 1/13/24 3/17/24  Aracelis Hatch MD   pravastatin (PRAVACHOL) 10 MG tablet Take 1 tablet by mouth Daily. 1/17/22 3/17/24  Chantale Julien APRN   pseudoephedrine (Sudafed) 30 MG tablet Take 1 tablet by mouth Every 4 (Four) Hours As Needed for Congestion. 8/21/23 3/17/24  Chantale Julien APRN       Allergies:    Allergies   Allergen Reactions    Lipitor [Atorvastatin Calcium]  Palpitations       Objective   Objective     Vital Signs  Temp:  [97.6 °F (36.4 °C)] 97.6 °F (36.4 °C)  Heart Rate:  [59-73] 73  Resp:  [14-18] 14  BP: ()/(47-63) 115/58  SpO2:  [98 %-100 %] 100 %  on   ;   Device (Oxygen Therapy): room air  Body mass index is 22.27 kg/m².    Physical Exam  Vitals and nursing note reviewed.   Constitutional:       Appearance: Normal appearance.   HENT:      Head: Normocephalic and atraumatic.      Right Ear: External ear normal.      Left Ear: External ear normal.      Nose: Nose normal.      Mouth/Throat:      Mouth: Mucous membranes are moist.      Pharynx: Oropharynx is clear.   Eyes:      Extraocular Movements: Extraocular movements intact.   Cardiovascular:      Rate and Rhythm: Normal rate and regular rhythm.      Pulses: Normal pulses.      Heart sounds: Normal heart sounds.   Pulmonary:      Effort: Pulmonary effort is normal.      Breath sounds: Normal breath sounds.   Abdominal:      General: Abdomen is flat. Bowel sounds are normal.      Palpations: Abdomen is soft.   Musculoskeletal:         General: Normal range of motion.      Cervical back: Normal range of motion.   Skin:     General: Skin is warm.   Neurological:      General: No focal deficit present.      Mental Status: She is alert and oriented to person, place, and time.   Psychiatric:         Mood and Affect: Mood is anxious.         Behavior: Behavior normal.           Results Review:  I have personally reviewed most recent cardiac tracings, lab results, and radiology images and interpretations and agree with findings, most notably: Troponin, proBNP, CMP, magnesium, TSH, T4, lipid panel, D-dimer, CBC, EKG, chest x-ray and stress test.    Results from last 7 days   Lab Units 03/16/24  2349   WBC 10*3/mm3 5.87   HEMOGLOBIN g/dL 12.7   HEMATOCRIT % 38.8   PLATELETS 10*3/mm3 210     Results from last 7 days   Lab Units 03/17/24  0155 03/16/24  2349   SODIUM mmol/L  --  141   POTASSIUM mmol/L  --  4.2    CHLORIDE mmol/L  --  104   CO2 mmol/L  --  26.0   BUN mg/dL  --  19   CREATININE mg/dL  --  0.78   GLUCOSE mg/dL  --  100*   CALCIUM mg/dL  --  9.2   ALK PHOS U/L  --  64   ALT (SGPT) U/L  --  11   AST (SGOT) U/L  --  13   HSTROP T ng/L <6 <6   PROBNP pg/mL  --  98.7     Estimated Creatinine Clearance: 76.7 mL/min (by C-G formula based on SCr of 0.78 mg/dL).  Brief Urine Lab Results  (Last result in the past 365 days)        Color   Clarity   Blood   Leuk Est   Nitrite   Protein   CREAT   Urine HCG        01/13/24 2238 Yellow   Slightly Cloudy   Trace   Small (1+)   Negative   Negative                   Microbiology Results (last 10 days)       ** No results found for the last 240 hours. **            ECG/EMG Results (most recent)       Procedure Component Value Units Date/Time    ECG 12 Lead Chest Pain [[id number]] Collected: 03/16/24 2253     Updated: 03/17/24 0657     QT Interval 394 ms      QTC Interval 400 ms     Narrative:      HEART RATE= 62  bpm  RR Interval= 968  ms  SD Interval= 157  ms  P Horizontal Axis= -2  deg  P Front Axis= 83  deg  QRSD Interval= 94  ms  QT Interval= 394  ms  QTcB= 400  ms  QRS Axis= 85  deg  T Wave Axis= 75  deg  - ABNORMAL ECG -  Sinus rhythm  Nonspecific T abnrm, anterolateral leads  No previous ECG available for comparison  Electronically Signed By: Kale Garces ([facility]) 17-Mar-2024 06:57:04  Date and Time of Study: 2024-03-16 22:53:55                    XR Chest 1 View    Result Date: 3/17/2024  Impression: Mild left basilar atelectasis. Electronically Signed: Charanjit Talbert MD  3/17/2024 12:36 AM EDT  Workstation ID: [id number]       Estimated Creatinine Clearance: 76.7 mL/min (by C-G formula based on SCr of 0.78 mg/dL).    Assessment & Plan   Assessment/Plan       Active Hospital Problems    Diagnosis  POA    **Chest pain [R07.9]  Yes      Resolved Hospital Problems   No resolved problems to display.        Chest pain, left arm pain and bilateral leg pain        Lab Results    Component Value Date     TROPONINT <6 03/17/2024     TROPONINT <6 03/16/2024   -Troponin and proBNP unremarkable  -CMP and CBC unremarkable  -TSH, T4, magnesium within normal range  -Lipid panel unremarkable except total cholesterol 205 and   -Chest X-ray: Mild left basilar atelectasis  -EKG: Showed sinus rhythm with heart rate of 62, PA interval 157 and   -In the ED pt given aspirin  -Stress Test no evidence of myocardial ischemia.  Some artifact noted.  -Telemetry  -D-dimer elevated at 0.82  -Venous duplex ultrasound bilateral lower extremity and left upper extremity ordered  -CT PE protocol ordered  -Intent to DC in a.m.     Depression/anxiety  -Continue Wellbutrin            VTE Prophylaxis -   Mechanical Order History:        Ordered        03/17/24 0337  Place Sequential Compression Device  Once            03/17/24 0337  Maintain Sequential Compression Device  Continuous                          Pharmalogical Order History:       None            CODE STATUS:    Code Status and Medical Interventions:   Ordered at: 03/17/24 0256     Level Of Support Discussed With:    Patient     Code Status (Patient has no pulse and is not breathing):    CPR (Attempt to Resuscitate)     Medical Interventions (Patient has pulse or is breathing):    Full Support       This patient has been examined wearing personal protective equipment.     I discussed the patient's findings and my recommendations with patient and nursing staff.      Signature:Electronically signed by ISELA Nicole, 03/17/24, 3:32 PM EDT.

## 2024-03-17 NOTE — DISCHARGE SUMMARY
[facility]    Chantale Julien APRN    CHIEF COMPLAINT:     Chest Pain     HISTORY OF PRESENT ILLNESS:    HPI  ED 03/16/2024   History of Present Illness  Patient is 59-year-old female history multiple sclerosis, presents to the ED with complaint of chest pain, symptoms radiates into the left arm and into her neck.  She describes it as a constant dull throbbing pain.  She reports her pain began on Thursday.  It is not exertional.  She is not having any episodes of syncope or diaphoresis.  She does report that she has visual changes, but she does have this with her MS at times.  Reports she has been more fatigued than normal.  No headache or thunderclap headache.  No new numbness tingling or weakness.    Observation 03/17/2024  Patient agrees with HPI noted above including chest pain radiating to left shoulder/left arm area for the past 2 days.  She denies any pain currently but states that she is mostly concerned with intermittent bilateral leg pain behind her calf and shooting up her tight.  She is very concerned with blood clots as she reports a family history of PE and DVT.    Observation 03/18/2024  Patient denies any pain or acute distress including chest pain and bilateral leg pain/left arm pain.  She is very anxious for discharge and due to her ride she cannot wait on ultrasound results and has to be discharged ASAP.  Discussed with patient that I would be calling her back this afternoon or tomorrow when results available.  Patient verbalized understanding    Past Medical History:   Diagnosis Date    Depression     GERD (gastroesophageal reflux disease)     Hyperlipidemia     Multiple sclerosis      Past Surgical History:   Procedure Laterality Date    EYE SURGERY      KNEE ARTHROSCOPY Left      Family History   Problem Relation Age of Onset    Hyperlipidemia Mother     Hyperlipidemia Father     Heart disease Father     Leukemia Father     No Known Problems Son     No Known Problems  Half-Sister     No Known Problems Half-Sister     No Known Problems Son      Social History     Tobacco Use    Smoking status: Former     Current packs/day: 0.00     Average packs/day: 0.3 packs/day for 4.0 years (1.0 ttl pk-yrs)     Types: Cigarettes     Start date:      Quit date: 1989     Years since quittin.2     Passive exposure: Past    Smokeless tobacco: Never   Vaping Use    Vaping status: Never Used   Substance Use Topics    Alcohol use: Never    Drug use: Never     Medications Prior to Admission   Medication Sig Dispense Refill Last Dose    buPROPion XL (WELLBUTRIN XL) 300 MG 24 hr tablet Take 1 tablet by mouth Daily. 90 tablet 1     loratadine (CLARITIN) 10 MG tablet Take 1 tablet by mouth Daily As Needed for Allergies.        Allergies:  Lipitor [atorvastatin calcium]    Immunization History   Administered Date(s) Administered    Flu Vaccine Quad PF 6-35MO 2017    H1N1 Inj 2009    Tdap 2018           REVIEW OF SYSTEMS:    ROS  Constitutional:  Positive for fatigue. Negative for chills, diaphoresis and fever.   Eyes:  Positive for photophobia. Negative for visual disturbance.   Respiratory:  Negative for shortness of breath.    Cardiovascular:  Positive for chest pain. Negative for palpitations and leg swelling.   Gastrointestinal:  Negative for abdominal pain.   Musculoskeletal:  Negative for back pain and neck pain.   Skin:  Negative for color change and rash.   Neurological:  Negative for dizziness, tremors, seizures, syncope, facial asymmetry, speech difficulty, weakness, light-headedness, numbness and headaches.     Vital Signs  Heart Rate:  [66-79] 73  Resp:  [13-14] 13  BP: (114-125)/(57-59) 114/57          Physical Exam:  Physical Exam  Vitals and nursing note reviewed.   Constitutional:       Appearance: Normal appearance.   HENT:      Head: Normocephalic and atraumatic.      Right Ear: External ear normal.      Left Ear: External ear normal.      Nose: Nose normal.       Mouth/Throat:      Mouth: Mucous membranes are moist.      Pharynx: Oropharynx is clear.   Eyes:      Extraocular Movements: Extraocular movements intact.   Cardiovascular:      Rate and Rhythm: Normal rate and regular rhythm.      Pulses: Normal pulses.      Heart sounds: Normal heart sounds.   Pulmonary:      Effort: Pulmonary effort is normal.      Breath sounds: Normal breath sounds.   Abdominal:      General: Abdomen is flat. Bowel sounds are normal.      Palpations: Abdomen is soft.   Musculoskeletal:         General: Normal range of motion.      Cervical back: Normal range of motion.   Skin:     General: Skin is warm.   Neurological:      General: No focal deficit present.      Mental Status: She is alert and oriented to person, place, and time.   Psychiatric:         Mood and Affect: Mood is anxious.         Behavior: Behavior normal.         Emotional Behavior:   Anxious  Debilities:   None  Results Review:    I reviewed the patient's new clinical results.  Lab Results (most recent)       Procedure Component Value Units Date/Time    T4, Free [[id number]]  (Normal) Collected: 03/17/24 0155    Specimen: Blood Updated: 03/17/24 1038     Free T4 1.18 ng/dL     Narrative:      Results may be falsely increased if patient taking Biotin.      Lipid Panel [[id number]]  (Abnormal) Collected: 03/17/24 0155    Specimen: Blood Updated: 03/17/24 1032     Total Cholesterol 205 mg/dL      Triglycerides 89 mg/dL      HDL Cholesterol 59 mg/dL      LDL Cholesterol  130 mg/dL      VLDL Cholesterol 16 mg/dL      LDL/HDL Ratio 2.17    Narrative:      Cholesterol Reference Ranges  (U.S. Department of Health and Human Services ATP III Classifications)    Desirable          <200 mg/dL  Borderline High    200-239 mg/dL  High Risk          >240 mg/dL      Triglyceride Reference Ranges  (U.S. Department of Health and Human Services ATP III Classifications)    Normal           <150 mg/dL  Borderline High  150-199 mg/dL  High              200-499 mg/dL  Very High        >500 mg/dL    HDL Reference Ranges  (U.S. Department of Health and Human Services ATP III Classifications)    Low     <40 mg/dl (major risk factor for CHD)  High    >60 mg/dl ('negative' risk factor for CHD)        LDL Reference Ranges  (U.S. Department of Health and Human Services ATP III Classifications)    Optimal          <100 mg/dL  Near Optimal     100-129 mg/dL  Borderline High  130-159 mg/dL  High             160-189 mg/dL  Very High        >189 mg/dL    TSH [[id number]]  (Normal) Collected: 03/17/24 0155    Specimen: Blood Updated: 03/17/24 0756     TSH 1.530 uIU/mL     Magnesium [[id number]]  (Normal) Collected: 03/17/24 0155    Specimen: Blood Updated: 03/17/24 0743     Magnesium 2.0 mg/dL     High Sensitivity Troponin T 2Hr [[id number]] Collected: 03/17/24 0155    Specimen: Blood Updated: 03/17/24 0234     HS Troponin T <6 ng/L      Troponin T Delta --     Comment: Unable to calculate.       Narrative:      High Sensitive Troponin T Reference Range:  <14.0 ng/L- Negative Female for AMI  <22.0 ng/L- Negative Male for AMI  >=14 - Abnormal Female indicating possible myocardial injury.  >=22 - Abnormal Male indicating possible myocardial injury.   Clinicians would have to utilize clinical acumen, EKG, Troponin, and serial changes to determine if it is an Acute Myocardial Infarction or myocardial injury due to an underlying chronic condition.         Stuart Draw [[id number]] Collected: 03/16/24 2349    Specimen: Blood Updated: 03/17/24 0100    Narrative:      The following orders were created for panel order Stuart Draw.  Procedure                               Abnormality         Status                     ---------                               -----------         ------                     Green Top (Gel)[[id number]]                                  Final result               Lavender Top[[id number]]                                     Final result               Gold Top -  SST[[id number]]                                   Final result               Light Blue Top[[id number]]                                   Final result                 Please view results for these tests on the individual orders.    Green Top (Gel) [[id number]] Collected: 03/16/24 2349    Specimen: Blood Updated: 03/17/24 0100     Extra Tube Hold for add-ons.     Comment: Auto resulted.       Lavender Top [[id number]] Collected: 03/16/24 2349    Specimen: Blood Updated: 03/17/24 0100     Extra Tube hold for add-on     Comment: Auto resulted       Gold Top - SST [[id number]] Collected: 03/16/24 2349    Specimen: Blood Updated: 03/17/24 0100     Extra Tube Hold for add-ons.     Comment: Auto resulted.       Light Blue Top [[id number]] Collected: 03/16/24 2349    Specimen: Blood Updated: 03/17/24 0100     Extra Tube Hold for add-ons.     Comment: Auto resulted       Comprehensive Metabolic Panel [[id number]]  (Abnormal) Collected: 03/16/24 2349    Specimen: Blood Updated: 03/17/24 0027     Glucose 100 mg/dL      BUN 19 mg/dL      Creatinine 0.78 mg/dL      Sodium 141 mmol/L      Potassium 4.2 mmol/L      Chloride 104 mmol/L      CO2 26.0 mmol/L      Calcium 9.2 mg/dL      Total Protein 6.9 g/dL      Albumin 4.5 g/dL      ALT (SGPT) 11 U/L      AST (SGOT) 13 U/L      Alkaline Phosphatase 64 U/L      Total Bilirubin 0.2 mg/dL      Globulin 2.4 gm/dL      A/G Ratio 1.9 g/dL      BUN/Creatinine Ratio 24.4     Anion Gap 11.0 mmol/L      eGFR 87.6 mL/min/1.73     Narrative:      GFR Normal >60  Chronic Kidney Disease <60  Kidney Failure <15      BNP [[id number]]  (Normal) Collected: 03/16/24 2349    Specimen: Blood Updated: 03/17/24 0027     proBNP 98.7 pg/mL     Narrative:      This assay is used as an aid in the diagnosis of individuals suspected of having heart failure. It can be used as an aid in the diagnosis of acute decompensated heart failure (ADHF) in patients presenting with signs and symptoms of ADHF to the emergency  department (ED). In addition, NT-proBNP of <300 pg/mL indicates ADHF is not likely.    Age Range Result Interpretation  NT-proBNP Concentration (pg/mL:      <50             Positive            >450                   Gray                 300-450                    Negative             <300    50-75           Positive            >900                  Gray                300-900                  Negative            <300      >75             Positive            >1800                  Gray                300-1800                  Negative            <300    High Sensitivity Troponin T [[id number]]  (Normal) Collected: 03/16/24 2349    Specimen: Blood Updated: 03/17/24 0027     HS Troponin T <6 ng/L     Narrative:      High Sensitive Troponin T Reference Range:  <14.0 ng/L- Negative Female for AMI  <22.0 ng/L- Negative Male for AMI  >=14 - Abnormal Female indicating possible myocardial injury.  >=22 - Abnormal Male indicating possible myocardial injury.   Clinicians would have to utilize clinical acumen, EKG, Troponin, and serial changes to determine if it is an Acute Myocardial Infarction or myocardial injury due to an underlying chronic condition.         CBC & Differential [[id number]]  (Normal) Collected: 03/16/24 2349    Specimen: Blood Updated: 03/17/24 0000    Narrative:      The following orders were created for panel order CBC & Differential.  Procedure                               Abnormality         Status                     ---------                               -----------         ------                     CBC Auto Differential[[id number]]        Normal              Final result                 Please view results for these tests on the individual orders.    CBC Auto Differential [[id number]]  (Normal) Collected: 03/16/24 2349    Specimen: Blood Updated: 03/17/24 0000     WBC 5.87 10*3/mm3      RBC 4.23 10*6/mm3      Hemoglobin 12.7 g/dL      Hematocrit 38.8 %      MCV 91.7 fL      MCH 30.0 pg      MCHC 32.7  g/dL      RDW 13.2 %      RDW-SD 44.2 fl      MPV 10.6 fL      Platelets 210 10*3/mm3      Neutrophil % 50.2 %      Lymphocyte % 36.8 %      Monocyte % 9.9 %      Eosinophil % 2.2 %      Basophil % 0.7 %      Immature Grans % 0.2 %      Neutrophils, Absolute 2.95 10*3/mm3      Lymphocytes, Absolute 2.16 10*3/mm3      Monocytes, Absolute 0.58 10*3/mm3      Eosinophils, Absolute 0.13 10*3/mm3      Basophils, Absolute 0.04 10*3/mm3      Immature Grans, Absolute 0.01 10*3/mm3      nRBC 0.0 /100 WBC             Imaging Results (Most Recent)       Procedure Component Value Units Date/Time    CT Angiogram Chest Pulmonary Embolism [[id number]] Collected: 03/17/24 1603     Updated: 03/17/24 1611    Narrative:      CT ANGIOGRAM CHEST PULMONARY EMBOLISM    Date of Exam: 3/17/2024 3:54 PM EDT    Indication: Pulmonary embolism (PE) suspected, positive D-dimer.    Comparison: None available.    Technique: Axial CT images were obtained of the chest after the uneventful intravenous administration of iodinated contrast utilizing pulmonary embolism protocol.  Sagittal and coronal reconstructions were performed.  Automated exposure control and   iterative reconstruction methods were used.      FINDINGS:  No significant focal filling defects are identified to indicate the presence of pulmonary embolism.    Subpleural opacities are noted within the bilateral lung apices and superior segment of the right lower lobe. The findings may indicate areas of scarring. Developing infiltrate within the right lower lobe could be considered. Scattered mild changes of   emphysema/COPD are noted. No pleural effusions are observed.     No significant hilar, mediastinal, or axillary lymphadenopathy is observed. A normal aortic arch branching pattern is identified. No significant coronary artery calcifications are identified. There is evidence for a nodule or cyst involving the left lobe   of the thyroid measuring 1 cm. The esophagus is  unremarkable.    The limited evaluation of the upper abdomen demonstrates no evidence for acute abnormality. A gallstone is observed in the gallbladder measuring approximately 2 cm.    No acute osseous abnormalities are observed.      Impression:      1.No evidence for pulmonary embolism.  2.Opacities are noted in the bilateral lung apices and superior segment the right lower lobe. These findings may relate to areas of scarring. Changes secondary to developing infiltrate in the superior segment the right lower lobe could also be   considered. Recommend correlation for signs or symptoms of acute infection. Also recommend follow-up to ensure stability versus improvement.      Electronically Signed: Jeffery Casarez MD    3/17/2024 4:09 PM EDT    Workstation ID: [id number]    XR Chest 1 View [[id number]] Collected: 03/17/24 0034     Updated: 03/17/24 0038    Narrative:      XR CHEST 1 VW    Date of Exam: 3/17/2024 12:15 AM EDT    Indication: chest pain    Comparison: Chest radiograph dated September 17, 2009    Findings:  The heart size and pulmonary vascular markings are normal. There is mild left basilar atelectasis. There are no focal consolidations to indicate pneumonia.      Impression:      Impression:  Mild left basilar atelectasis.        Electronically Signed: Charanjit Talbert MD    3/17/2024 12:36 AM EDT    Workstation ID: [id number]          reviewed    ECG/EMG Results (most recent)       Procedure Component Value Units Date/Time    ECG 12 Lead Chest Pain [[id number]] Collected: 03/16/24 2253     Updated: 03/17/24 0657     QT Interval 394 ms      QTC Interval 400 ms     Narrative:      HEART RATE= 62  bpm  RR Interval= 968  ms  TN Interval= 157  ms  P Horizontal Axis= -2  deg  P Front Axis= 83  deg  QRSD Interval= 94  ms  QT Interval= 394  ms  QTcB= 400  ms  QRS Axis= 85  deg  T Wave Axis= 75  deg  - ABNORMAL ECG -  Sinus rhythm  Nonspecific T abnrm, anterolateral leads  No previous ECG available for  comparison  Electronically Signed By: Kale Garces (Vasile) 17-Mar-2024 06:57:04  Date and Time of Study: 2024-03-16 22:53:55    Telemetry Scan [[id number]] Resulted: 03/16/24     Updated: 03/18/24 0737    Telemetry Scan [[id number]] Resulted: 03/16/24     Updated: 03/18/24 0824          reviewed            Microbiology Results (last 10 days)       ** No results found for the last 240 hours. **            Assessment & Plan     Chest pain        Chest pain, left arm pain and bilateral leg pain            Lab Results   Component Value Date     TROPONINT <6 03/17/2024     TROPONINT <6 03/16/2024   -Troponin and proBNP unremarkable  -CMP and CBC unremarkable  -TSH, T4, magnesium within normal range  -Lipid panel unremarkable except total cholesterol 205 and   -Chest X-ray: Mild left basilar atelectasis  -EKG: Showed sinus rhythm with heart rate of 62, [place] interval 157 and   -In the ED pt given aspirin  -Stress Test no evidence of myocardial ischemia.  Some artifact noted.  -Telemetry  -D-dimer elevated at 0.82  -CT PE protocol negative for PE  -Venous duplex ultrasound bilateral lower extremity and left upper extremity ordered. Tests done this morning but results pending. Patient unable to wait on results due to ride situation. Discussed with patient that I would be calling her this afternoon or tomorrow when results available. She verbalizes understanding. Will treat per guidelines if positive for DVT.   -F/u with PCP in 5-7 days      Depression/anxiety  -Continue Wellbutrin       I discussed the patients findings and my recommendations with patient and nursing staff.     Discharge Diagnosis:      Chest pain      Hospital Course  Patient is a 59 y.o. female presented with chest pain as noted in HPI above.  Troponin, CMP, CBC, proBNP, TSH, T4 and magnesium within normal range.  Lipid panel showed mild hyperlipidemia with cholesterol 205 and .  Chest x-ray showed mild left basilar atelectasis.  EKG showed  sinus rhythm.  Patient was given aspirin in the ED and kept in the observation unit for a stress test which showed no signs of ischemia.  However patient expressed to provider concern with bilateral intermittent leg pain and left arm pain.  Reports family history of DVTs PE.  D-dimer was ordered and was positive.  CT PE protocol negative for PE.  Bilateral venous duplex ultrasound lower extremity and left upper extremity ordered yesterday but unable to complete until today due to lack of staff on .  Patient was kept a second midnight in the observation unit for the ultrasound.  She had the test done this morning but results are still pending.  She is anxious for discharge and unable to wait on the results.  She requested provider to call when results available.  At this time, patient felt to be in good condition for discharge with close follow up with PCP. Instructed to take all medications as prescribed and to return to ED if any concerning signs/symptoms. All test/lab results were discussed with patient. All questions were answered and patient verbalizes understanding.   .      Past Medical History:     Past Medical History:   Diagnosis Date    Depression     GERD (gastroesophageal reflux disease)     Hyperlipidemia     Multiple sclerosis        Past Surgical History:     Past Surgical History:   Procedure Laterality Date    EYE SURGERY      KNEE ARTHROSCOPY Left        Social History:   Social History     Socioeconomic History    Marital status: Unknown   Tobacco Use    Smoking status: Former     Current packs/day: 0.00     Average packs/day: 0.3 packs/day for 4.0 years (1.0 ttl pk-yrs)     Types: Cigarettes     Start date:      Quit date: 1989     Years since quittin.2     Passive exposure: Past    Smokeless tobacco: Never   Vaping Use    Vaping status: Never Used   Substance and Sexual Activity    Alcohol use: Never    Drug use: Never    Sexual activity: Defer       Procedures Performed          Consults:   Consults       No orders found from 2/16/2024 to 3/17/2024.            Condition on Discharge:     Stable    Discharge Disposition  Home or Self Care    Discharge Medications     Discharge Medications        Continue These Medications        Instructions Start Date   buPROPion  MG 24 hr tablet  Commonly known as: WELLBUTRIN XL   300 mg, Oral, Daily      loratadine 10 MG tablet  Commonly known as: CLARITIN   10 mg, Oral, Daily PRN               Discharge Diet:   Diet Instructions       Diet: Regular/House Diet; Regular (IDDSI 7); Thin (IDDSI 0)      Discharge Diet: Regular/House Diet    Texture: Regular (IDDSI 7)    Fluid Consistency: Thin (IDDSI 0)            Activity at Discharge:     Follow-up Appointments  No future appointments.  Additional Instructions for the Follow-ups that You Need to Schedule       Discharge Follow-up with PCP   As directed       Currently Documented PCP:    Chantale Julien APRN    PCP Phone Number:    [phone]     Follow Up Details: 5-7 days                Test Results Pending at Discharge       Risk for Readmission (LACE) Score: 3 (3/18/2024  6:00 AM)      Greater than 30 minutes spent in discharge activities for this patient    Signature:Electronically signed by ISELA Nicole, 03/17/24, 11:27 AM EDT.

## 2024-03-17 NOTE — NURSING NOTE
Pt pleasant cooperative. Explained to patient that the stresstest would be done between 7:30-8 and that she can have coffee and breakfast after the test

## 2024-03-17 NOTE — DISCHARGE INSTRUCTIONS
Follow up with your pcp, call for an appointment   Follow up with cardiology, call for an appointment

## 2024-03-17 NOTE — ED PROVIDER NOTES
Subjective   Chief Complaint   Patient presents with    Chest Pain     Pt reports midsternal CP that radiates to L arm that she states started Thursday.  Pt reports a constant, dull throbbing pain.  Pt reports ASA seems to temporarily relieve it.  Pt reports she was sent from [facility] in [place].        History of Present Illness  Patient is 59-year-old female history multiple sclerosis, presents to the ED with complaint of chest pain, symptoms radiates into the left arm and into her neck.  She describes it as a constant dull throbbing pain.  She reports her pain began on Thursday.  It is not exertional.  She is not having any episodes of syncope or diaphoresis.  She does report that she has visual changes, but she does have this with her MS at times.  Reports she has been more fatigued than normal.  No headache or thunderclap headache.  No new numbness tingling or weakness.  Review of Systems   Constitutional:  Positive for fatigue. Negative for chills, diaphoresis and fever.   Eyes:  Positive for photophobia. Negative for visual disturbance.   Respiratory:  Negative for shortness of breath.    Cardiovascular:  Positive for chest pain. Negative for palpitations and leg swelling.   Gastrointestinal:  Negative for abdominal pain.   Musculoskeletal:  Negative for back pain and neck pain.   Skin:  Negative for color change and rash.   Neurological:  Negative for dizziness, tremors, seizures, syncope, facial asymmetry, speech difficulty, weakness, light-headedness, numbness and headaches.       Past Medical History:   Diagnosis Date    Depression     GERD (gastroesophageal reflux disease)     Hyperlipidemia     Multiple sclerosis        Allergies   Allergen Reactions    Lipitor [Atorvastatin Calcium] Palpitations       Past Surgical History:   Procedure Laterality Date    EYE SURGERY      KNEE ARTHROSCOPY Left        Family History   Problem Relation Age of Onset    Hyperlipidemia Mother     Hyperlipidemia Father      Heart disease Father     Leukemia Father     No Known Problems Son     No Known Problems Half-Sister     No Known Problems Half-Sister     No Known Problems Son        Social History     Socioeconomic History    Marital status: Unknown   Tobacco Use    Smoking status: Former     Current packs/day: 0.00     Average packs/day: 0.3 packs/day for 4.0 years (1.0 ttl pk-yrs)     Types: Cigarettes     Start date:      Quit date: 1989     Years since quittin.2     Passive exposure: Past    Smokeless tobacco: Never   Vaping Use    Vaping status: Never Used   Substance and Sexual Activity    Alcohol use: Never    Drug use: Never    Sexual activity: Defer           Objective   Physical Exam  Vitals and nursing note reviewed.   Constitutional:       Appearance: She is well-developed. She is not toxic-appearing.   HENT:      Head: Normocephalic and atraumatic.   Eyes:      Extraocular Movements: Extraocular movements intact.      Pupils: Pupils are equal, round, and reactive to light.   Cardiovascular:      Rate and Rhythm: Normal rate and regular rhythm.      Pulses:           Dorsalis pedis pulses are 2+ on the right side and 2+ on the left side.      Heart sounds: Normal heart sounds. No murmur heard.     No friction rub. No gallop.   Pulmonary:      Effort: Pulmonary effort is normal.      Breath sounds: Normal breath sounds.   Abdominal:      General: Bowel sounds are normal.      Palpations: Abdomen is soft.   Musculoskeletal:         General: Normal range of motion.      Cervical back: Normal range of motion and neck supple.      Right lower leg: No tenderness. No edema.      Left lower leg: No tenderness. No edema.   Skin:     General: Skin is warm and dry.      Capillary Refill: Capillary refill takes less than 2 seconds.   Neurological:      General: No focal deficit present.      Mental Status: She is alert and oriented to person, place, and time.         Procedures           ED Course  ED Course as  "of 03/17/24 0308   Sun Mar 17, 2024   0211 Spoke with patient at bedside.  She reports she is agitated because she was not told how long her ED visit would last.    I explained we were working on her 2-hour troponin to see if there is any changes from the initial troponin.  Advise lab work can take around 30 minutes to 1 hour.  She states \"will continue call and find out\".  I advised that is a standard time, and that if it appears to be taking longer than normal we would call.  If we were awaiting her test results    I discussed her workup with her.  Explained we get the full lab work back and go over labs all at once.    Patient declines to stay.  We discussed admission further evaluation of her chest pain with stress test.  She states \"I have responsibilities I am not staying\". [LB]   0217 EKG sinus rhythm rate of 62.  No previous available.  No acute ST change.  Corroborated with ED attending physician [LB]   0244 I had an extensive discussion with the patient regarding her workup, her negative troponin, given her symptoms advised to ED observation for stress test. Patient continues to state \"I wish I knew exactly how long this would take \".  I explained that there would be no specific timeline given, as the testing was ordered and multiple departments are involved. [LB]   0253 Patient spoke to Dr. Garces, she is agreeable to stay to for observation [LB]      ED Course User Index  [LB] Paris Pal APRN                HEART Score: 2                              Medical Decision Making  Problems Addressed:  Chest pain, unspecified type: complicated acute illness or injury    Amount and/or Complexity of Data Reviewed  Labs: ordered.  Radiology: ordered.  ECG/medicine tests: ordered.    Risk  OTC drugs.  Prescription drug management.  Decision regarding hospitalization.    Reviewed neurology office visit 5/3/2023.    Imaging: XR Chest 1 View    Result Date: 3/17/2024  Impression: Mild left basilar " atelectasis. Electronically Signed: Charanjit Talbert MD  3/17/2024 12:36 AM EDT  Workstation ID: [id number]     Pt was seen by ED attending.     Patient presents to the ED for the above complaint, underwent the above, exam and workup.  Placed on appropriate monitoring.  Differential diagnoses considered for patient presentation, this list is not all inclusive of diagnoses considered: Electrolyte imbalance, musculoskeletal pain, MS flare, ACS, STEMI, NSTEMI.  Consider nitroglycerin for chest pain, however patient does have a lower blood pressure, low 100s.  She reports that her pain is improved rates it a 4 out of 10, is only located in her upper arm, it is symptoms worse with movement at this time.  She does report she has been extremely fatigued.  2 negative troponins here in the ED, however patient's not had a recent stress test.  Given her symptoms of chest pain, fatigue will place in ED observation with stress test ordered for    Please see workup evaluation with patient    Disposition: Placed in ED observation.  Note Disclaimer: At [facility], we believe that sharing information builds trust and better relationships. You are receiving this note because you recently visited [facility]. It is possible you will see health information before a provider has talked with you about it. This kind of information can be easy to misunderstand. To help you fully understand what it means for your health, we urge you to discuss this note with your provider.Note dictated utilizing Dragon Dictation. Appropriate PPE worn during patient interactions.        Final diagnoses:   Chest pain, unspecified type       ED Disposition  ED Disposition       ED Disposition   Decision to Admit    Condition   --    Comment   --               Chantale Julien, APRN  [address]  [address] [place] [address] [phone]    Schedule an appointment as soon as possible for a visit       [facility] EMERGENCY DEPARTMENT  [address]  [facility] [address] [phone]        Henri Kendall MD  [address] [place] [address] [phone]               Medication List      No changes were made to your prescriptions during this visit.            Paris Pal, APRN  03/17/24 0308

## 2024-03-18 ENCOUNTER — APPOINTMENT (OUTPATIENT)
Dept: CARDIOLOGY | Facility: HOSPITAL | Age: 59
End: 2024-03-18
Payer: MEDICARE

## 2024-03-18 ENCOUNTER — READMISSION MANAGEMENT (OUTPATIENT)
Dept: CALL CENTER | Facility: HOSPITAL | Age: 59
End: 2024-03-18
Payer: MEDICARE

## 2024-03-18 VITALS
SYSTOLIC BLOOD PRESSURE: 114 MMHG | HEIGHT: 66 IN | WEIGHT: 138 LBS | OXYGEN SATURATION: 95 % | HEART RATE: 73 BPM | DIASTOLIC BLOOD PRESSURE: 57 MMHG | BODY MASS INDEX: 22.18 KG/M2 | TEMPERATURE: 97.6 F | RESPIRATION RATE: 13 BRPM

## 2024-03-18 LAB
ANION GAP SERPL CALCULATED.3IONS-SCNC: 11 MMOL/L (ref 5–15)
BASOPHILS # BLD AUTO: 0.03 10*3/MM3 (ref 0–0.2)
BASOPHILS NFR BLD AUTO: 0.6 % (ref 0–1.5)
BH CV LOWER VASCULAR LEFT COMMON FEMORAL AUGMENT: NORMAL
BH CV LOWER VASCULAR LEFT COMMON FEMORAL COMPETENT: NORMAL
BH CV LOWER VASCULAR LEFT COMMON FEMORAL COMPRESS: NORMAL
BH CV LOWER VASCULAR LEFT COMMON FEMORAL PHASIC: NORMAL
BH CV LOWER VASCULAR LEFT COMMON FEMORAL SPONT: NORMAL
BH CV LOWER VASCULAR LEFT DISTAL FEMORAL COMPRESS: NORMAL
BH CV LOWER VASCULAR LEFT GASTRONEMIUS COMPRESS: NORMAL
BH CV LOWER VASCULAR LEFT GREATER SAPH AK COMPRESS: NORMAL
BH CV LOWER VASCULAR LEFT GREATER SAPH BK COMPRESS: NORMAL
BH CV LOWER VASCULAR LEFT LESSER SAPH COMPRESS: NORMAL
BH CV LOWER VASCULAR LEFT MID FEMORAL AUGMENT: NORMAL
BH CV LOWER VASCULAR LEFT MID FEMORAL COMPETENT: NORMAL
BH CV LOWER VASCULAR LEFT MID FEMORAL COMPRESS: NORMAL
BH CV LOWER VASCULAR LEFT MID FEMORAL PHASIC: NORMAL
BH CV LOWER VASCULAR LEFT MID FEMORAL SPONT: NORMAL
BH CV LOWER VASCULAR LEFT PERONEAL COMPRESS: NORMAL
BH CV LOWER VASCULAR LEFT POPLITEAL AUGMENT: NORMAL
BH CV LOWER VASCULAR LEFT POPLITEAL COMPETENT: NORMAL
BH CV LOWER VASCULAR LEFT POPLITEAL COMPRESS: NORMAL
BH CV LOWER VASCULAR LEFT POPLITEAL PHASIC: NORMAL
BH CV LOWER VASCULAR LEFT POPLITEAL SPONT: NORMAL
BH CV LOWER VASCULAR LEFT POSTERIOR TIBIAL COMPRESS: NORMAL
BH CV LOWER VASCULAR LEFT PROXIMAL FEMORAL COMPRESS: NORMAL
BH CV LOWER VASCULAR LEFT SAPHENOFEMORAL JUNCTION COMPRESS: NORMAL
BH CV LOWER VASCULAR RIGHT COMMON FEMORAL AUGMENT: NORMAL
BH CV LOWER VASCULAR RIGHT COMMON FEMORAL COMPETENT: NORMAL
BH CV LOWER VASCULAR RIGHT COMMON FEMORAL COMPRESS: NORMAL
BH CV LOWER VASCULAR RIGHT COMMON FEMORAL PHASIC: NORMAL
BH CV LOWER VASCULAR RIGHT COMMON FEMORAL SPONT: NORMAL
BH CV LOWER VASCULAR RIGHT DISTAL FEMORAL COMPRESS: NORMAL
BH CV LOWER VASCULAR RIGHT GASTRONEMIUS COMPRESS: NORMAL
BH CV LOWER VASCULAR RIGHT GREATER SAPH AK COMPRESS: NORMAL
BH CV LOWER VASCULAR RIGHT GREATER SAPH BK COMPRESS: NORMAL
BH CV LOWER VASCULAR RIGHT LESSER SAPH COMPRESS: NORMAL
BH CV LOWER VASCULAR RIGHT MID FEMORAL AUGMENT: NORMAL
BH CV LOWER VASCULAR RIGHT MID FEMORAL COMPETENT: NORMAL
BH CV LOWER VASCULAR RIGHT MID FEMORAL COMPRESS: NORMAL
BH CV LOWER VASCULAR RIGHT MID FEMORAL PHASIC: NORMAL
BH CV LOWER VASCULAR RIGHT MID FEMORAL SPONT: NORMAL
BH CV LOWER VASCULAR RIGHT PERONEAL COMPRESS: NORMAL
BH CV LOWER VASCULAR RIGHT POPLITEAL AUGMENT: NORMAL
BH CV LOWER VASCULAR RIGHT POPLITEAL COMPETENT: NORMAL
BH CV LOWER VASCULAR RIGHT POPLITEAL COMPRESS: NORMAL
BH CV LOWER VASCULAR RIGHT POPLITEAL PHASIC: NORMAL
BH CV LOWER VASCULAR RIGHT POPLITEAL SPONT: NORMAL
BH CV LOWER VASCULAR RIGHT POSTERIOR TIBIAL COMPRESS: NORMAL
BH CV LOWER VASCULAR RIGHT PROXIMAL FEMORAL COMPRESS: NORMAL
BH CV LOWER VASCULAR RIGHT SAPHENOFEMORAL JUNCTION COMPRESS: NORMAL
BH CV UPPER VENOUS LEFT AXILLARY AUGMENT: NORMAL
BH CV UPPER VENOUS LEFT AXILLARY COMPRESS: NORMAL
BH CV UPPER VENOUS LEFT AXILLARY PHASIC: NORMAL
BH CV UPPER VENOUS LEFT AXILLARY SPONT: NORMAL
BH CV UPPER VENOUS LEFT BASILIC FOREARM COMPRESS: NORMAL
BH CV UPPER VENOUS LEFT BASILIC UPPER COMPRESS: NORMAL
BH CV UPPER VENOUS LEFT BRACHIAL COMPRESS: NORMAL
BH CV UPPER VENOUS LEFT CEPHALIC FOREARM COMPRESS: NORMAL
BH CV UPPER VENOUS LEFT CEPHALIC UPPER COMPRESS: NORMAL
BH CV UPPER VENOUS LEFT INTERNAL JUGULAR AUGMENT: NORMAL
BH CV UPPER VENOUS LEFT INTERNAL JUGULAR COMPRESS: NORMAL
BH CV UPPER VENOUS LEFT INTERNAL JUGULAR PHASIC: NORMAL
BH CV UPPER VENOUS LEFT INTERNAL JUGULAR SPONT: NORMAL
BH CV UPPER VENOUS LEFT RADIAL COMPRESS: NORMAL
BH CV UPPER VENOUS LEFT SUBCLAVIAN AUGMENT: NORMAL
BH CV UPPER VENOUS LEFT SUBCLAVIAN COMPRESS: NORMAL
BH CV UPPER VENOUS LEFT SUBCLAVIAN PHASIC: NORMAL
BH CV UPPER VENOUS LEFT SUBCLAVIAN SPONT: NORMAL
BH CV UPPER VENOUS LEFT ULNAR COMPRESS: NORMAL
BH CV UPPER VENOUS RIGHT INTERNAL JUGULAR AUGMENT: NORMAL
BH CV UPPER VENOUS RIGHT INTERNAL JUGULAR COMPRESS: NORMAL
BH CV UPPER VENOUS RIGHT INTERNAL JUGULAR PHASIC: NORMAL
BH CV UPPER VENOUS RIGHT INTERNAL JUGULAR SPONT: NORMAL
BH CV UPPER VENOUS RIGHT SUBCLAVIAN AUGMENT: NORMAL
BH CV UPPER VENOUS RIGHT SUBCLAVIAN COMPRESS: NORMAL
BH CV UPPER VENOUS RIGHT SUBCLAVIAN PHASIC: NORMAL
BH CV UPPER VENOUS RIGHT SUBCLAVIAN SPONT: NORMAL
BUN SERPL-MCNC: 14 MG/DL (ref 6–20)
BUN/CREAT SERPL: 20.3 (ref 7–25)
CALCIUM SPEC-SCNC: 9.2 MG/DL (ref 8.6–10.5)
CHLORIDE SERPL-SCNC: 105 MMOL/L (ref 98–107)
CO2 SERPL-SCNC: 25 MMOL/L (ref 22–29)
CREAT SERPL-MCNC: 0.69 MG/DL (ref 0.57–1)
DEPRECATED RDW RBC AUTO: 43.4 FL (ref 37–54)
EGFRCR SERPLBLD CKD-EPI 2021: 100.1 ML/MIN/1.73
EOSINOPHIL # BLD AUTO: 0.18 10*3/MM3 (ref 0–0.4)
EOSINOPHIL NFR BLD AUTO: 3.3 % (ref 0.3–6.2)
ERYTHROCYTE [DISTWIDTH] IN BLOOD BY AUTOMATED COUNT: 13 % (ref 12.3–15.4)
GLUCOSE SERPL-MCNC: 93 MG/DL (ref 65–99)
HCT VFR BLD AUTO: 40 % (ref 34–46.6)
HGB BLD-MCNC: 13.1 G/DL (ref 12–15.9)
IMM GRANULOCYTES # BLD AUTO: 0.01 10*3/MM3 (ref 0–0.05)
IMM GRANULOCYTES NFR BLD AUTO: 0.2 % (ref 0–0.5)
LYMPHOCYTES # BLD AUTO: 1.97 10*3/MM3 (ref 0.7–3.1)
LYMPHOCYTES NFR BLD AUTO: 36.4 % (ref 19.6–45.3)
MCH RBC QN AUTO: 29.7 PG (ref 26.6–33)
MCHC RBC AUTO-ENTMCNC: 32.8 G/DL (ref 31.5–35.7)
MCV RBC AUTO: 90.7 FL (ref 79–97)
MONOCYTES # BLD AUTO: 0.51 10*3/MM3 (ref 0.1–0.9)
MONOCYTES NFR BLD AUTO: 9.4 % (ref 5–12)
NEUTROPHILS NFR BLD AUTO: 2.71 10*3/MM3 (ref 1.7–7)
NEUTROPHILS NFR BLD AUTO: 50.1 % (ref 42.7–76)
NRBC BLD AUTO-RTO: 0 /100 WBC (ref 0–0.2)
PLATELET # BLD AUTO: 213 10*3/MM3 (ref 140–450)
PMV BLD AUTO: 11.1 FL (ref 6–12)
POTASSIUM SERPL-SCNC: 3.9 MMOL/L (ref 3.5–5.2)
RBC # BLD AUTO: 4.41 10*6/MM3 (ref 3.77–5.28)
SODIUM SERPL-SCNC: 141 MMOL/L (ref 136–145)
WBC NRBC COR # BLD AUTO: 5.41 10*3/MM3 (ref 3.4–10.8)

## 2024-03-18 PROCEDURE — G0378 HOSPITAL OBSERVATION PER HR: HCPCS

## 2024-03-18 PROCEDURE — 93970 EXTREMITY STUDY: CPT

## 2024-03-18 PROCEDURE — 93971 EXTREMITY STUDY: CPT

## 2024-03-18 PROCEDURE — 93970 EXTREMITY STUDY: CPT | Performed by: SURGERY

## 2024-03-18 PROCEDURE — 93971 EXTREMITY STUDY: CPT | Performed by: SURGERY

## 2024-03-18 PROCEDURE — 85025 COMPLETE CBC W/AUTO DIFF WBC: CPT | Performed by: NURSE PRACTITIONER

## 2024-03-18 PROCEDURE — 80048 BASIC METABOLIC PNL TOTAL CA: CPT | Performed by: NURSE PRACTITIONER

## 2024-03-18 RX ADMIN — BUPROPION HYDROCHLORIDE 300 MG: 150 TABLET, EXTENDED RELEASE ORAL at 07:25

## 2024-03-18 RX ADMIN — CETIRIZINE HYDROCHLORIDE 10 MG: 10 TABLET, FILM COATED ORAL at 07:26

## 2024-03-18 NOTE — PLAN OF CARE
Goal Outcome Evaluation:  Plan of Care Reviewed With: patient        Progress: no change  Outcome Evaluation: Patient had a myoview today, it was negative. Patient to havea bilateral lower extremity doppler and of left arm 3/18.

## 2024-03-18 NOTE — PLAN OF CARE
Problem: Adult Inpatient Plan of Care  Goal: Plan of Care Review  Outcome: Ongoing, Not Progressing  Flowsheets (Taken 3/18/2024 0443)  Plan of Care Reviewed With: patient  Outcome Evaluation: Patient to have doppler of bilateral lower extremities and left arm today. Bed in lowest position,call light within reach, patient has no complaints of pain at this time.  Goal: Patient-Specific Goal (Individualized)  Outcome: Ongoing, Not Progressing  Goal: Absence of Hospital-Acquired Illness or Injury  Outcome: Ongoing, Not Progressing  Intervention: Identify and Manage Fall Risk  Description: Perform standard risk assessment on admission using a validated tool or comprehensive approach appropriate to the patient; reassess fall risk frequently, with change in status or transfer to another level of care.  Communicate fall injury risk to interprofessional healthcare team.  Determine need for increased observation, equipment and environmental modification, such as low bed, signage and supportive, nonskid footwear.  Adjust safety measures to individual developmental age, stage and identified risk factors.  Reinforce the importance of safety and physical activity with patient and family.  Perform regular intentional rounding to assess need for position change, pain assessment and personal needs, including assistance with toileting.  Recent Flowsheet Documentation  Taken 3/17/2024 2200 by Hilda Roth, RN  Safety Promotion/Fall Prevention: safety round/check completed  Taken 3/17/2024 2000 by Hilda Roth RN  Safety Promotion/Fall Prevention: safety round/check completed  Intervention: Prevent Skin Injury  Description: Perform a screening for skin injury risk, such as pressure or moisture associated skin damage on admission and at regular intervals throughout hospital stay.  Keep all areas of skin (especially folds) clean and dry.  Maintain adequate skin hydration.  Relieve and redistribute pressure and protect bony  prominences; implement measures based on patient-specific risk factors.  Match turning and repositioning schedule to clinical condition.  Encourage weight shift frequently; assist with reposition if unable to complete independently.  Float heels off bed; avoid pressure on the Achilles tendon.  Keep skin free from extended contact with medical devices.  Encourage functional activity and mobility, as early as tolerated.  Use aids (e.g., slide boards, mechanical lift) during transfer.  Recent Flowsheet Documentation  Taken 3/17/2024 2000 by Hilda Roth RN  Body Position: position changed independently  Skin Protection: adhesive use limited  Intervention: Prevent and Manage VTE (Venous Thromboembolism) Risk  Description: Assess for VTE (venous thromboembolism) risk.  Encourage and assist with early ambulation.  Initiate and maintain compression or other therapy, as indicated, based on identified risk in accordance with organizational protocol and provider order.  Encourage both active and passive leg exercises while in bed, if unable to ambulate.  Recent Flowsheet Documentation  Taken 3/17/2024 2000 by Hilda Roth RN  Activity Management: up ad edna  Intervention: Prevent Infection  Description: Maintain skin and mucous membrane integrity; promote hand, oral and pulmonary hygiene.  Optimize fluid balance, nutrition, sleep and glycemic control to maximize infection resistance.  Identify potential sources of infection early to prevent or mitigate progression of infection (e.g., wound, lines, devices).  Evaluate ongoing need for invasive devices; remove promptly when no longer indicated.  Recent Flowsheet Documentation  Taken 3/17/2024 2200 by Hilda Roth, RN  Infection Prevention:   hand hygiene promoted   rest/sleep promoted   single patient room provided  Taken 3/17/2024 2000 by Hilda Roth RN  Infection Prevention:   hand hygiene promoted   rest/sleep promoted   single patient room  provided  Goal: Optimal Comfort and Wellbeing  Outcome: Ongoing, Not Progressing  Intervention: Provide Person-Centered Care  Description: Use a family-focused approach to care.  Develop trust and rapport by proactively providing information, encouraging questions, addressing concerns and offering reassurance.  Acknowledge emotional response to hospitalization.  Recognize and utilize personal coping strategies.  Honor spiritual and cultural preferences.  Recent Flowsheet Documentation  Taken 3/17/2024 2000 by Hilda Roth RN  Trust Relationship/Rapport:   care explained   choices provided   emotional support provided   empathic listening provided   questions answered   questions encouraged   reassurance provided   thoughts/feelings acknowledged  Goal: Readiness for Transition of Care  Outcome: Ongoing, Not Progressing     Problem: Chest Pain  Goal: Resolution of Chest Pain Symptoms  Outcome: Ongoing, Not Progressing   Goal Outcome Evaluation:  Plan of Care Reviewed With: patient           Outcome Evaluation: Patient to have doppler of bilateral lower extremities and left arm today. Bed in lowest position,call light within reach, patient has no complaints of pain at this time.

## 2024-03-18 NOTE — OUTREACH NOTE
Prep Survey      Flowsheet Row Responses   [facility] patient discharged from? [facility]   Is LACE score < 7 ? Yes   Eligibility [facility]   Date of Admission 03/16/24   Date of Discharge 03/18/24   Discharge Disposition Home or Self Care   Discharge diagnosis chest pain   Does the patient have one of the following disease processes/diagnoses(primary or secondary)? Other   Does the patient have Home health ordered? No   Is there a DME ordered? No   Prep survey completed? Yes            Ana Maria SMITH - Registered Nurse

## 2024-03-18 NOTE — PLAN OF CARE
Problem: Adult Inpatient Plan of Care  Goal: Patient-Specific Goal (Individualized)  Outcome: Ongoing, Progressing  Goal: Absence of Hospital-Acquired Illness or Injury  Outcome: Ongoing, Progressing  Intervention: Identify and Manage Fall Risk  Recent Flowsheet Documentation  Taken 3/18/2024 0730 by Leslie Canchola, RN  Safety Promotion/Fall Prevention: patient off unit  Taken 3/18/2024 0727 by Leslie Canchola, RN  Safety Promotion/Fall Prevention: safety round/check completed  Intervention: Prevent Skin Injury  Recent Flowsheet Documentation  Taken 3/18/2024 0727 by Leslie Canchola RN  Body Position: position changed independently  Intervention: Prevent and Manage VTE (Venous Thromboembolism) Risk  Recent Flowsheet Documentation  Taken 3/18/2024 0727 by Leslie Canchola RN  Activity Management: up ad edna  Intervention: Prevent Infection  Recent Flowsheet Documentation  Taken 3/18/2024 0727 by Leslie Canchola, RN  Infection Prevention: hand hygiene promoted  Goal: Optimal Comfort and Wellbeing  Outcome: Ongoing, Progressing  Goal: Readiness for Transition of Care  Outcome: Ongoing, Progressing     Problem: Chest Pain  Goal: Resolution of Chest Pain Symptoms  Outcome: Ongoing, Progressing   Goal Outcome Evaluation:  Plan of Care Reviewed With: patient        Progress: no change       DC home                            Patient notified that per Kaiden Chavez MD nurse they will not be able to do a preop on him today as he is only in clinic for 1/2 a day and has a full schedule.  He also states that he verified with insurance that the DNA test that was discussed at yesterdays visit would be covered.  So he is wondering if you can order that so he can come in and get that lab drawn.   Elissa Smith LPN........................4/18/2018  11:14 AM

## 2024-03-18 NOTE — CASE MANAGEMENT/SOCIAL WORK
Case Management Discharge Note      Final Note: Routine  home               Transportation Services  Private: Car    Final Discharge Disposition Code: 01 - home or self-care

## 2024-03-19 ENCOUNTER — TRANSITIONAL CARE MANAGEMENT TELEPHONE ENCOUNTER (OUTPATIENT)
Dept: CALL CENTER | Facility: HOSPITAL | Age: 59
End: 2024-03-19
Payer: MEDICARE

## 2024-03-19 ENCOUNTER — TELEPHONE (OUTPATIENT)
Dept: FAMILY MEDICINE CLINIC | Facility: CLINIC | Age: 59
End: 2024-03-19
Payer: MEDICARE

## 2024-03-19 NOTE — TELEPHONE ENCOUNTER
RELAY    Receive msg from Transitional care to call patient and set up Hospital Follow up, as patient was not available to make appt with nurse.    I attempted to call patient, there was NO answer and patients Voicemail box was full, so I was unable to leave a voicemail.    If she calls back could you please make her an appointment for a Hospital Follow Up appt with Chantale Julien?    Thanks!  ~Jovanna

## 2024-03-19 NOTE — OUTREACH NOTE
Call Center TCM Note      Flowsheet Row Responses   [facility] patient discharged from? [facility]   Does the patient have one of the following disease processes/diagnoses(primary or secondary)? Other   TCM attempt successful? Yes   Call start time 1426   Call end time 1447   Discharge diagnosis chest pain   Person spoke with today (if not patient) and relationship pt   Meds reviewed with patient/caregiver? Yes   Is the patient having any side effects they believe may be caused by any medication additions or changes? No   Does the patient have all medications ordered at discharge? Yes   Is the patient taking all medications as directed (includes completed medication regime)? Yes   Comments [facility] as pt want to go where PCP suggests. Pt also will inquire about a GI, Vascular referrals.   Does the patient have an appointment with their PCP within 7-14 days of discharge? No appointments available   Nursing Interventions Routed TCM call to PCP office   Psychosocial issues? No   Did the patient receive a copy of their discharge instructions? Yes   Nursing interventions Reviewed instructions with patient   What is the patient's perception of their health status since discharge? Improving   Is the patient/caregiver able to teach back signs and symptoms related to disease process for when to call PCP? Yes   Is the patient/caregiver able to teach back signs and symptoms related to disease process for when to call 911? Yes   Is the patient/caregiver able to teach back the hierarchy of who to call/visit for symptoms/problems? PCP, Specialist, Home health nurse, Urgent Care, ED, 911 Yes   If the patient is a current smoker, are they able to teach back resources for cessation? Not a smoker   TCM call completed? Yes   Wrap up additional comments Pt states she is not having chest pain, but has left neck/shoulder pain that radiates to elbow. No medication issues. Routed message to PCP office  for [facility] appt as there are no appts that satisfy TCM.   Call end time 1447   Would this patient benefit from a Referral to [facility] Social Work? No   Is the patient interested in additional calls from an ambulatory ? No            Cami Purvis RN    3/19/2024, 14:49 EDT

## 2024-03-19 NOTE — ED NOTES
Patient was called on 3/19/24 with results of venous duplex upper extremity and bilateral lower extremity ultrasound.  Verified patient last name and date of birth and notified her of normal results, no SVT or DVT noted.  Patient asked provider again what could be wrong and as discussed during admission provider encourage patient to follow-up with PCP for further recommendations/evaluation.  Patient verbalizes understanding

## 2024-03-19 NOTE — PROGRESS NOTES
RELAY  Tried to call patient, no answer and voicemail box was full so I could not leave a vm.    I created a telephone note with a RELAY message for [facility] to make appt if she does call back.Will need a Hospital Follow Up appt made with Chantale.

## 2024-05-21 ENCOUNTER — TELEPHONE (OUTPATIENT)
Dept: FAMILY MEDICINE CLINIC | Facility: CLINIC | Age: 59
End: 2024-05-21
Payer: MEDICARE

## 2024-05-21 NOTE — TELEPHONE ENCOUNTER
Pt was diagnosed w/pneumonia the end of April at urgent care in [place].  She states she feeling some better, but chest still feels full and ribs hurt.  She has to change pcp's due to insurance, but hasn't been seen yet by new provider because they are waiting on medical records from us.  Pt wants to have an addition abx if possible.  Please advise.

## 2024-05-23 RX ORDER — DOXYCYCLINE 100 MG/1
100 CAPSULE ORAL 2 TIMES DAILY
Qty: 20 CAPSULE | Refills: 0 | Status: SHIPPED | OUTPATIENT
Start: 2024-05-23

## 2024-11-09 ENCOUNTER — APPOINTMENT (OUTPATIENT)
Dept: GENERAL RADIOLOGY | Facility: HOSPITAL | Age: 59
End: 2024-11-09
Payer: MEDICARE

## 2024-11-09 ENCOUNTER — HOSPITAL ENCOUNTER (OUTPATIENT)
Facility: HOSPITAL | Age: 59
Discharge: HOME | End: 2024-11-09
Attending: EMERGENCY MEDICINE | Admitting: EMERGENCY MEDICINE
Payer: MEDICARE

## 2024-11-09 VITALS
HEIGHT: 66 IN | TEMPERATURE: 97.9 F | WEIGHT: 133 LBS | SYSTOLIC BLOOD PRESSURE: 137 MMHG | OXYGEN SATURATION: 97 % | RESPIRATION RATE: 18 BRPM | HEART RATE: 67 BPM | DIASTOLIC BLOOD PRESSURE: 57 MMHG | BODY MASS INDEX: 21.38 KG/M2

## 2024-11-09 DIAGNOSIS — R05.9 COUGH IN ADULT: Primary | ICD-10-CM

## 2024-11-09 LAB
AMORPH URATE CRY URNS QL MICRO: ABNORMAL /HPF
BACTERIA UR QL AUTO: ABNORMAL /HPF
BILIRUB UR QL STRIP: NEGATIVE
CLARITY UR: CLEAR
COLOR UR: YELLOW
GLUCOSE UR STRIP-MCNC: NEGATIVE MG/DL
HGB UR QL STRIP.AUTO: NEGATIVE
HYALINE CASTS UR QL AUTO: ABNORMAL /LPF
KETONES UR QL STRIP: NEGATIVE
LEUKOCYTE ESTERASE UR QL STRIP.AUTO: ABNORMAL
NITRITE UR QL STRIP: NEGATIVE
PH UR STRIP.AUTO: 7 [PH] (ref 5–8)
PROT UR QL STRIP: NEGATIVE
RBC # UR STRIP: ABNORMAL /HPF
REF LAB TEST METHOD: ABNORMAL
SP GR UR STRIP: 1.02 (ref 1–1.03)
SQUAMOUS #/AREA URNS HPF: ABNORMAL /HPF
UROBILINOGEN UR QL STRIP: ABNORMAL
WBC # UR STRIP: ABNORMAL /HPF

## 2024-11-09 PROCEDURE — 71046 X-RAY EXAM CHEST 2 VIEWS: CPT

## 2024-11-09 PROCEDURE — G0463 HOSPITAL OUTPT CLINIC VISIT: HCPCS

## 2024-11-09 PROCEDURE — 81001 URINALYSIS AUTO W/SCOPE: CPT | Performed by: EMERGENCY MEDICINE

## 2024-11-09 PROCEDURE — 99214 OFFICE O/P EST MOD 30 MIN: CPT

## 2024-11-09 PROCEDURE — EDLOS

## 2024-11-09 PROCEDURE — 81015 MICROSCOPIC EXAM OF URINE: CPT

## 2024-11-09 RX ORDER — GUAIFENESIN 600 MG/1
600 TABLET, EXTENDED RELEASE ORAL 2 TIMES DAILY
Qty: 14 TABLET | Refills: 0 | Status: SHIPPED | OUTPATIENT
Start: 2024-11-09 | End: 2024-11-16

## 2024-11-09 RX ORDER — AZITHROMYCIN 250 MG/1
TABLET, FILM COATED ORAL
Qty: 6 TABLET | Refills: 0 | Status: SHIPPED | OUTPATIENT
Start: 2024-11-09

## 2024-11-09 RX ORDER — BENZONATATE 200 MG/1
200 CAPSULE ORAL 3 TIMES DAILY PRN
Qty: 21 CAPSULE | Refills: 0 | Status: SHIPPED | OUTPATIENT
Start: 2024-11-09 | End: 2024-11-16

## 2024-11-10 NOTE — DISCHARGE INSTRUCTIONS
Alternate Tylenol and ibuprofen as needed for body aches and fever    Increase your fluid intake with water    Begin Zithromax for treatment of lower respiratory tract infection/cough    Take Mucinex to help with mucus production cough    Take Tessalon Perles for nighttime coughing up    Follow-up with your family doctor later this week for recheck return to ER for worsening symptom

## 2024-11-10 NOTE — FS[facility] PROVIDER NOTE
Subjective   History of Present Illness  59-year-old female reports she was diagnosed with COVID approximately a week ago.  She reports that she is continued to have cough and is now coughing up yellow phlegm.  She was reporting generalized fatigue.  She is denying chest pain shortness of breath vomiting diarrhea.        Review of Systems   All other systems reviewed and are negative.      Past Medical History:   Diagnosis Date    Depression     GERD (gastroesophageal reflux disease)     Hyperlipidemia     Multiple sclerosis        Allergies   Allergen Reactions    Lipitor [Atorvastatin Calcium] Palpitations       Past Surgical History:   Procedure Laterality Date    EYE SURGERY      KNEE ARTHROSCOPY Left        Family History   Problem Relation Age of Onset    Hyperlipidemia Mother     Hyperlipidemia Father     Heart disease Father     Leukemia Father     No Known Problems Son     No Known Problems Half-Sister     No Known Problems Half-Sister     No Known Problems Son        Social History     Socioeconomic History    Marital status: Unknown   Tobacco Use    Smoking status: Former     Current packs/day: 0.00     Average packs/day: 0.3 packs/day for 4.0 years (1.0 ttl pk-yrs)     Types: Cigarettes     Start date:      Quit date: 1989     Years since quittin.8     Passive exposure: Past    Smokeless tobacco: Never   Vaping Use    Vaping status: Never Used   Substance and Sexual Activity    Alcohol use: Never    Drug use: Never    Sexual activity: Defer           Objective   Physical Exam  Vitals and nursing note reviewed.   Constitutional:       General: She is not in acute distress.     Appearance: Normal appearance. She is not ill-appearing or toxic-appearing.   HENT:      Head: Normocephalic and atraumatic.      Nose: Nose normal.      Mouth/Throat:      Mouth: Mucous membranes are moist.      Pharynx: Oropharynx is clear.   Eyes:      Extraocular Movements: Extraocular movements intact.       Conjunctiva/sclera: Conjunctivae normal.      Pupils: Pupils are equal, round, and reactive to light.   Cardiovascular:      Rate and Rhythm: Normal rate.      Pulses: Normal pulses.   Pulmonary:      Effort: Pulmonary effort is normal. No respiratory distress.      Breath sounds: Normal breath sounds.   Abdominal:      General: Abdomen is flat. Bowel sounds are normal.      Palpations: Abdomen is soft.   Musculoskeletal:         General: Normal range of motion.      Cervical back: Normal range of motion.   Skin:     General: Skin is warm.   Neurological:      General: No focal deficit present.      Mental Status: She is oriented to person, place, and time. Mental status is at baseline.         Procedures           ED Course  ED Course as of 11/09/24 1939   Sat Nov 09, 2024   1901 UA is unremarkable [WF]   1914 Chest x-ray     IMPRESSION:  No acute cardiopulmonary findings.   [WF]      ED Course User Index  [WF] Daryn Waters Jr., APRN                                           Medical Decision Making  Chest x-ray is negative for acute findings    Given patient's recent history of COVID and worsening cough will discharge home on Zithromax and Mucinex Tessalon Perles.    Problems Addressed:  Cough in adult: complicated acute illness or injury    Amount and/or Complexity of Data Reviewed  Radiology: ordered.    Risk  OTC drugs.  Prescription drug management.        Final diagnoses:   Cough in adult       ED Disposition  ED Disposition       ED Disposition   Discharge    Condition   Stable    Comment   --               PATIENT CONNECTION - [facility] [address]  [phone]  Schedule an appointment as soon as possible for a visit   Or follow-up with your primary care provider         Medication List        New Prescriptions      azithromycin 250 MG tablet  Commonly known as: Zithromax Z-Omer  Take 2 tablets by mouth on day 1, then 1 tablet daily on days 2-5     benzonatate 200 MG capsule  Commonly known  as: TESSALON  Take 1 capsule by mouth 3 (Three) Times a Day As Needed for Cough for up to 7 days.     guaiFENesin 600 MG 12 hr tablet  Commonly known as: MUCINEX  Take 1 tablet by mouth 2 (Two) Times a Day for 7 days.               Where to Get Your Medications        These medications were sent to [facility] [address] [place] [address] [place] - [address] [facility] - [phone]  - [phone]   [address] [facility] [place] [address]      Phone: [phone]   azithromycin 250 MG tablet  benzonatate 200 MG capsule  guaiFENesin 600 MG 12 hr tablet

## 2024-12-12 NOTE — TELEPHONE ENCOUNTER
Called pt back and clarified that she takes Ibrutinib 280 mg  tablet and she take 2 tabs a day. Pt verbalized understanding and stated that her  was confused on her dose HUB TO READ     MAILBOX FULL     Xray normal, please advise